# Patient Record
Sex: FEMALE | Race: WHITE | NOT HISPANIC OR LATINO | Employment: FULL TIME | URBAN - NONMETROPOLITAN AREA
[De-identification: names, ages, dates, MRNs, and addresses within clinical notes are randomized per-mention and may not be internally consistent; named-entity substitution may affect disease eponyms.]

---

## 2018-02-02 ENCOUNTER — OFFICE VISIT (OUTPATIENT)
Dept: FAMILY MEDICINE CLINIC | Facility: CLINIC | Age: 33
End: 2018-02-02

## 2018-02-02 VITALS
RESPIRATION RATE: 20 BRPM | HEIGHT: 62 IN | OXYGEN SATURATION: 99 % | SYSTOLIC BLOOD PRESSURE: 140 MMHG | WEIGHT: 273.4 LBS | DIASTOLIC BLOOD PRESSURE: 98 MMHG | BODY MASS INDEX: 50.31 KG/M2 | TEMPERATURE: 99.1 F | HEART RATE: 75 BPM

## 2018-02-02 DIAGNOSIS — R53.83 FATIGUE, UNSPECIFIED TYPE: ICD-10-CM

## 2018-02-02 DIAGNOSIS — Z76.89 ENCOUNTER TO ESTABLISH CARE: ICD-10-CM

## 2018-02-02 DIAGNOSIS — I10 ESSENTIAL HYPERTENSION: Primary | ICD-10-CM

## 2018-02-02 DIAGNOSIS — Z13.220 SCREENING FOR HYPERCHOLESTEROLEMIA: ICD-10-CM

## 2018-02-02 PROCEDURE — 99214 OFFICE O/P EST MOD 30 MIN: CPT | Performed by: NURSE PRACTITIONER

## 2018-02-02 RX ORDER — CLONIDINE HYDROCHLORIDE 0.2 MG/1
0.2 TABLET ORAL 2 TIMES DAILY
COMMUNITY
End: 2018-02-13 | Stop reason: SDUPTHER

## 2018-02-02 RX ORDER — AMLODIPINE BESYLATE 10 MG/1
10 TABLET ORAL DAILY
Qty: 30 TABLET | Refills: 3 | Status: SHIPPED | OUTPATIENT
Start: 2018-02-02 | End: 2018-02-27 | Stop reason: SDUPTHER

## 2018-02-02 RX ORDER — FERROUS SULFATE 325(65) MG
325 TABLET ORAL 3 TIMES DAILY
COMMUNITY
End: 2018-09-19

## 2018-02-02 RX ORDER — HYDRALAZINE HYDROCHLORIDE 10 MG/1
10 TABLET, FILM COATED ORAL 2 TIMES DAILY
COMMUNITY
End: 2018-02-13 | Stop reason: SDUPTHER

## 2018-02-02 RX ORDER — LISINOPRIL 40 MG/1
40 TABLET ORAL DAILY
COMMUNITY
End: 2018-02-02

## 2018-02-02 RX ORDER — LABETALOL 200 MG/1
200 TABLET, FILM COATED ORAL 2 TIMES DAILY
COMMUNITY
End: 2018-02-20 | Stop reason: SDUPTHER

## 2018-02-02 NOTE — PROGRESS NOTES
Subjective   Do Miles is a 32 y.o. female.  Establish primary care.  Has hypertension and is currently on four different medications and is her blood pressure is still high.  Has headaches almost every Monday through Friday but on the weekends headaches will go away.  Believes the headaches are more related to stress.  Initially hypertension developed while she was pertinent with her first daughter but then went away after she gave birth.  Returns when she was pregnant with her second daughter 4 years ago and has not gotten any better.  Last menstrual cycle 01/18/2018.  Has a Mirena and her periods are very regular monthly.      Hypertension   This is a chronic problem. The current episode started more than 1 year ago. The problem is unchanged. The problem is uncontrolled. Associated symptoms include headaches and neck pain. There are no associated agents to hypertension. Risk factors for coronary artery disease include family history, obesity, sedentary lifestyle and stress. Past treatments include ACE inhibitors and beta blockers. The current treatment provides moderate improvement. There are no compliance problems.         The following portions of the patient's history were reviewed and updated as appropriate: allergies, current medications, past family history, past medical history, past social history, past surgical history and problem list.    Review of Systems   Constitutional: Negative.    HENT: Negative for rhinorrhea, sinus pain and sinus pressure.    Eyes: Negative.    Respiratory: Negative.    Cardiovascular: Negative.    Gastrointestinal: Negative.    Genitourinary: Negative.    Musculoskeletal: Positive for neck pain.   Skin: Negative.    Neurological: Positive for headaches.       Objective   Physical Exam   Constitutional: She is oriented to person, place, and time. She appears well-developed and well-nourished.   HENT:   Head: Normocephalic.   Right Ear: External ear normal.   Left Ear:  External ear normal.   Mouth/Throat: Oropharynx is clear and moist.   Eyes: EOM are normal. Pupils are equal, round, and reactive to light.   Neck: Normal range of motion. Neck supple.   Cardiovascular: Normal rate, regular rhythm and normal heart sounds.    Pulmonary/Chest: Effort normal and breath sounds normal.   Abdominal: Soft. Bowel sounds are normal.   Musculoskeletal: Normal range of motion.   Neurological: She is alert and oriented to person, place, and time.   Skin: Skin is warm and dry.   Psychiatric: She has a normal mood and affect. Her behavior is normal. Judgment and thought content normal.   Nursing note and vitals reviewed.      Assessment/Plan   Problems Addressed this Visit     None      Visit Diagnoses     Essential hypertension    -  Primary    Relevant Medications    labetalol (NORMODYNE) 200 MG tablet    hydrALAZINE (APRESOLINE) 10 MG tablet    CloNIDine (CATAPRES) 0.2 MG tablet    amLODIPine (NORVASC) 10 MG tablet    Fatigue, unspecified type        Relevant Orders    CBC w AUTO Differential    Comprehensive metabolic panel    TSH    Screening for hypercholesterolemia        Relevant Orders    Lipid panel    Encounter to establish care            1.  Essential hypertension:  Continue on labetalol, hydralazine and clonidine as previously prescribed  Continue lisinopril at this time  Begin prescription for Norvasc 10 mg as prescribed be taken 1 tablet by mouth daily  Encouraged to reduce sodium intake to no more than 2400 mg per day    2.  Fatigue, unspecified type:  Complete lab work is ordered including CBC, chemistry panel and TSH and will call with results when available  Discussed obesity as a factor to fatigue  Will schedule appointment to discuss weight loss options in 4 weeks  Information provided on Saxenda and Contrave    3.  Screening for hypercholesterolemia:  Complete lab work is ordered including fasting lipid panel and will call with results when available    4.  Encounter to  establish care:  Schedule annual exam with Pap smear in 4 weeks        This document has been electronically signed by JEWELS Sierra on February 2, 2018 9:51 AM

## 2018-02-05 ENCOUNTER — OFFICE VISIT (OUTPATIENT)
Dept: FAMILY MEDICINE CLINIC | Facility: CLINIC | Age: 33
End: 2018-02-05

## 2018-02-05 VITALS
SYSTOLIC BLOOD PRESSURE: 126 MMHG | HEIGHT: 62 IN | DIASTOLIC BLOOD PRESSURE: 80 MMHG | WEIGHT: 273 LBS | BODY MASS INDEX: 50.24 KG/M2 | OXYGEN SATURATION: 98 % | TEMPERATURE: 99.4 F | HEART RATE: 81 BPM

## 2018-02-05 DIAGNOSIS — J06.9 ACUTE URI: Primary | ICD-10-CM

## 2018-02-05 PROCEDURE — 99213 OFFICE O/P EST LOW 20 MIN: CPT | Performed by: FAMILY MEDICINE

## 2018-02-09 ENCOUNTER — OFFICE VISIT (OUTPATIENT)
Dept: FAMILY MEDICINE CLINIC | Facility: CLINIC | Age: 33
End: 2018-02-09

## 2018-02-09 VITALS
DIASTOLIC BLOOD PRESSURE: 100 MMHG | BODY MASS INDEX: 50.24 KG/M2 | TEMPERATURE: 99.9 F | HEART RATE: 100 BPM | OXYGEN SATURATION: 99 % | HEIGHT: 62 IN | SYSTOLIC BLOOD PRESSURE: 140 MMHG | WEIGHT: 273 LBS

## 2018-02-09 DIAGNOSIS — J22 LOWER RESPIRATORY INFECTION: Primary | ICD-10-CM

## 2018-02-09 PROCEDURE — 96372 THER/PROPH/DIAG INJ SC/IM: CPT | Performed by: FAMILY MEDICINE

## 2018-02-09 PROCEDURE — 99213 OFFICE O/P EST LOW 20 MIN: CPT | Performed by: FAMILY MEDICINE

## 2018-02-09 RX ORDER — CEFTRIAXONE 1 G/1
1 INJECTION, POWDER, FOR SOLUTION INTRAMUSCULAR; INTRAVENOUS ONCE
Status: COMPLETED | OUTPATIENT
Start: 2018-02-09 | End: 2018-02-09

## 2018-02-09 RX ORDER — AZITHROMYCIN 250 MG/1
TABLET, FILM COATED ORAL
Qty: 6 TABLET | Refills: 0 | Status: SHIPPED | OUTPATIENT
Start: 2018-02-09 | End: 2018-03-19 | Stop reason: HOSPADM

## 2018-02-09 RX ADMIN — CEFTRIAXONE 1 G: 1 INJECTION, POWDER, FOR SOLUTION INTRAMUSCULAR; INTRAVENOUS at 10:10

## 2018-02-09 NOTE — PROGRESS NOTES
Subjective:     Do Miles is a 32 y.o. female who presents for evaluation of acute illness.    Chief Complaint   Patient presents with   • Cough     chest pain    • Chills       HPI Comments: Patient was diagnosed with influenza on Tuesday.  She reports that she continues to have fevers.  She has cough and chest congestion.  Reports some pleuritic chest pain when coughing.  States she doesn't seem to be getting any better.     Cough   This is a new problem. The current episode started in the past 7 days. The problem has been unchanged. The problem occurs constantly. Associated symptoms include chest pain (pleuritic), chills, a fever, myalgias, nasal congestion and rhinorrhea. Pertinent negatives include no headaches, rash, sore throat, shortness of breath or wheezing. She has tried OTC cough suppressant for the symptoms. The treatment provided no relief. There is no history of asthma.   Chills   Associated symptoms include chest pain (pleuritic), chills, congestion, coughing, fatigue, a fever and myalgias. Pertinent negatives include no abdominal pain, arthralgias, headaches, nausea, rash, sore throat, vomiting or weakness.        The following portions of the patient's history were reviewed and updated as appropriate: allergies, current medications, past family history, past medical history, past social history, past surgical history and problem list.    Past Medical History:   Diagnosis Date   • Allergic    • Anemia    • Anxiety    • Diabetes mellitus     Gestational   • Heart murmur    • History of ear infections    • Hypertension    • Influenza    • Intrahepatic cholestasis of pregnancy    • Obesity    • Pneumonia    • Strep throat    • Urinary tract infection        Past Surgical History:   Procedure Laterality Date   • ANKLE SURGERY      left ankle   •  SECTION     • DILATION AND CURETTAGE, DIAGNOSTIC / THERAPEUTIC     • HYSTEROSCOPY         Family History   Problem Relation Age of Onset   •  Hypertension Mother    • Uterine cancer Mother    • No Known Problems Father    • No Known Problems Sister    • No Known Problems Brother    • Eczema Daughter    • Hypertension Maternal Grandmother    • Hypertension Maternal Grandfather    • Diabetes Maternal Grandfather    • No Known Problems Paternal Grandmother    • No Known Problems Paternal Grandfather    • No Known Problems Sister    • Eczema Daughter          Current Outpatient Prescriptions:   •  amLODIPine (NORVASC) 10 MG tablet, Take 1 tablet by mouth Daily., Disp: 30 tablet, Rfl: 3  •  CloNIDine (CATAPRES) 0.2 MG tablet, Take 0.2 mg by mouth 2 (Two) Times a Day. AT BEDTIME, Disp: , Rfl:   •  ferrous sulfate 325 (65 FE) MG tablet, Take 325 mg by mouth 3 (Three) Times a Day., Disp: , Rfl:   •  hydrALAZINE (APRESOLINE) 10 MG tablet, Take 10 mg by mouth 2 (Two) Times a Day., Disp: , Rfl:   •  labetalol (NORMODYNE) 200 MG tablet, Take 200 mg by mouth 2 (Two) Times a Day., Disp: , Rfl:   •  levonorgestrel (MIRENA) 20 MCG/24HR IUD, 1 each by Intrauterine route 1 (One) Time., Disp: , Rfl:     Allergies   Allergen Reactions   • Latex    • Adhesive Tape Hives       Social History     Social History   • Marital status:      Spouse name: N/A   • Number of children: N/A   • Years of education: N/A     Occupational History   • Not on file.     Social History Main Topics   • Smoking status: Never Smoker   • Smokeless tobacco: Never Used   • Alcohol use No   • Drug use: No   • Sexual activity: Yes     Partners: Male     Birth control/ protection: IUD     Other Topics Concern   • Not on file     Social History Narrative   • No narrative on file       Review of Systems   Constitutional: Positive for chills, fatigue and fever.   HENT: Positive for congestion and rhinorrhea. Negative for sinus pressure and sore throat.    Respiratory: Positive for cough. Negative for shortness of breath, wheezing and stridor.    Cardiovascular: Positive for chest pain (pleuritic).  "Negative for palpitations.   Gastrointestinal: Negative for abdominal pain, constipation, diarrhea, nausea and vomiting.   Genitourinary: Negative for decreased urine volume, difficulty urinating and dysuria.   Musculoskeletal: Positive for myalgias. Negative for arthralgias.   Skin: Negative for color change and rash.   Neurological: Negative for dizziness, weakness, light-headedness and headaches.   Psychiatric/Behavioral: Negative for confusion and decreased concentration. The patient is not nervous/anxious.        Objective:     /100  Pulse 100  Temp 99.9 °F (37.7 °C)  Ht 157.5 cm (62\")  Wt 124 kg (273 lb)  LMP 01/18/2018  SpO2 99%  BMI 49.93 kg/m2    Physical Exam   Constitutional: She is oriented to person, place, and time. She appears well-developed and well-nourished.   HENT:   Head: Normocephalic and atraumatic.   Nose: Mucosal edema present.   Mouth/Throat: Posterior oropharyngeal erythema present.   Eyes: Conjunctivae and EOM are normal. Pupils are equal, round, and reactive to light.   Neck: Normal range of motion. Neck supple.   Cardiovascular: Normal rate, regular rhythm, normal heart sounds and intact distal pulses.  Exam reveals no gallop and no friction rub.    No murmur heard.  Pulmonary/Chest: Effort normal. No respiratory distress. She has no wheezes. She has rhonchi in the left middle field. She has no rales.   Abdominal: Soft. Bowel sounds are normal. She exhibits no distension. There is no tenderness.   Musculoskeletal: Normal range of motion.   Neurological: She is alert and oriented to person, place, and time.   Skin: Skin is warm and dry.   Psychiatric: She has a normal mood and affect. Her behavior is normal.   Vitals reviewed.      Assessment/Plan:     Do was seen today for cough and chills.    Diagnoses and all orders for this visit:    Lower respiratory infection  -     cefTRIAXone (ROCEPHIN) injection 1 g; Inject 1 g into the shoulder, thigh, or buttocks 1 (One) " Time.  -     azithromycin (ZITHROMAX Z-MAHESH) 250 MG tablet; Take 2 tablets by mouth the first day, then 1 tablet daily for the next 4 days.  -     XR Chest PA & Lateral; Future      Return for Next scheduled follow up.    Goals     • Recover from flu            Barriers: none            Return for Next scheduled follow up.    Signature  Luz Marina Hennessy MD PGY3  [place] [address]  Office: [phone]    This document has been electronically signed by Luz Marina Hennessy MD on February 9, 2018 9:54 AM

## 2018-02-12 ENCOUNTER — TELEPHONE (OUTPATIENT)
Dept: ENDOCRINOLOGY | Facility: CLINIC | Age: 33
End: 2018-02-12

## 2018-02-13 DIAGNOSIS — I10 ESSENTIAL HYPERTENSION: Primary | ICD-10-CM

## 2018-02-13 RX ORDER — CLONIDINE HYDROCHLORIDE 0.2 MG/1
0.2 TABLET ORAL
Qty: 30 TABLET | Refills: 5 | Status: SHIPPED | OUTPATIENT
Start: 2018-02-13 | End: 2018-07-05 | Stop reason: SDUPTHER

## 2018-02-13 RX ORDER — HYDRALAZINE HYDROCHLORIDE 10 MG/1
10 TABLET, FILM COATED ORAL 2 TIMES DAILY
Qty: 60 TABLET | Refills: 5 | Status: SHIPPED | OUTPATIENT
Start: 2018-02-13 | End: 2018-07-05 | Stop reason: SDUPTHER

## 2018-02-20 ENCOUNTER — TELEPHONE (OUTPATIENT)
Dept: FAMILY MEDICINE CLINIC | Facility: CLINIC | Age: 33
End: 2018-02-20

## 2018-02-20 DIAGNOSIS — I10 ESSENTIAL HYPERTENSION: Primary | ICD-10-CM

## 2018-02-20 RX ORDER — LABETALOL 200 MG/1
200 TABLET, FILM COATED ORAL 2 TIMES DAILY
Qty: 60 TABLET | Refills: 3 | Status: SHIPPED | OUTPATIENT
Start: 2018-02-20 | End: 2018-05-31

## 2018-02-27 DIAGNOSIS — I10 ESSENTIAL HYPERTENSION: ICD-10-CM

## 2018-02-27 RX ORDER — AMLODIPINE BESYLATE 10 MG/1
10 TABLET ORAL DAILY
Qty: 30 TABLET | Refills: 3 | Status: SHIPPED | OUTPATIENT
Start: 2018-02-27 | End: 2018-05-31

## 2018-03-19 ENCOUNTER — OFFICE VISIT (OUTPATIENT)
Dept: FAMILY MEDICINE CLINIC | Facility: CLINIC | Age: 33
End: 2018-03-19

## 2018-03-19 ENCOUNTER — LAB (OUTPATIENT)
Dept: LAB | Facility: HOSPITAL | Age: 33
End: 2018-03-19

## 2018-03-19 VITALS
HEART RATE: 71 BPM | HEIGHT: 62 IN | TEMPERATURE: 98 F | SYSTOLIC BLOOD PRESSURE: 150 MMHG | BODY MASS INDEX: 50.66 KG/M2 | DIASTOLIC BLOOD PRESSURE: 98 MMHG | OXYGEN SATURATION: 99 % | WEIGHT: 275.3 LBS | RESPIRATION RATE: 20 BRPM

## 2018-03-19 DIAGNOSIS — R53.83 FATIGUE, UNSPECIFIED TYPE: ICD-10-CM

## 2018-03-19 DIAGNOSIS — Z13.220 SCREENING FOR HYPERCHOLESTEROLEMIA: ICD-10-CM

## 2018-03-19 DIAGNOSIS — J01.00 ACUTE NON-RECURRENT MAXILLARY SINUSITIS: ICD-10-CM

## 2018-03-19 DIAGNOSIS — E66.01 MORBID OBESITY WITH BMI OF 50.0-59.9, ADULT (HCC): Primary | ICD-10-CM

## 2018-03-19 LAB
ALBUMIN SERPL-MCNC: 4.1 G/DL (ref 3.4–4.8)
ALBUMIN/GLOB SERPL: 1.2 G/DL (ref 1.1–1.8)
ALP SERPL-CCNC: 104 U/L (ref 38–126)
ALT SERPL W P-5'-P-CCNC: 32 U/L (ref 9–52)
ANION GAP SERPL CALCULATED.3IONS-SCNC: 14 MMOL/L (ref 5–15)
ARTICHOKE IGE QN: 91 MG/DL (ref 1–129)
AST SERPL-CCNC: 34 U/L (ref 14–36)
BASOPHILS # BLD AUTO: 0.07 10*3/MM3 (ref 0–0.2)
BASOPHILS NFR BLD AUTO: 0.7 % (ref 0–2)
BILIRUB SERPL-MCNC: 0.2 MG/DL (ref 0.2–1.3)
BUN BLD-MCNC: 14 MG/DL (ref 7–21)
BUN/CREAT SERPL: 18.4 (ref 7–25)
CALCIUM SPEC-SCNC: 10.1 MG/DL (ref 8.4–10.2)
CHLORIDE SERPL-SCNC: 102 MMOL/L (ref 95–110)
CHOLEST SERPL-MCNC: 174 MG/DL (ref 0–199)
CO2 SERPL-SCNC: 28 MMOL/L (ref 22–31)
CREAT BLD-MCNC: 0.76 MG/DL (ref 0.5–1)
DEPRECATED RDW RBC AUTO: 43.5 FL (ref 36.4–46.3)
EOSINOPHIL # BLD AUTO: 0.24 10*3/MM3 (ref 0–0.7)
EOSINOPHIL NFR BLD AUTO: 2.4 % (ref 0–7)
ERYTHROCYTE [DISTWIDTH] IN BLOOD BY AUTOMATED COUNT: 14.5 % (ref 11.5–14.5)
GFR SERPL CREATININE-BSD FRML MDRD: 88 ML/MIN/1.73 (ref 64–149)
GLOBULIN UR ELPH-MCNC: 3.4 GM/DL (ref 2.3–3.5)
GLUCOSE BLD-MCNC: 96 MG/DL (ref 60–100)
HCT VFR BLD AUTO: 36.2 % (ref 35–45)
HDLC SERPL-MCNC: 44 MG/DL (ref 60–200)
HGB BLD-MCNC: 11.4 G/DL (ref 12–15.5)
IMM GRANULOCYTES # BLD: 0.02 10*3/MM3 (ref 0–0.02)
IMM GRANULOCYTES NFR BLD: 0.2 % (ref 0–0.5)
LDLC/HDLC SERPL: 2.67 {RATIO} (ref 0–3.22)
LYMPHOCYTES # BLD AUTO: 1.5 10*3/MM3 (ref 0.6–4.2)
LYMPHOCYTES NFR BLD AUTO: 15 % (ref 10–50)
MCH RBC QN AUTO: 26.1 PG (ref 26.5–34)
MCHC RBC AUTO-ENTMCNC: 31.5 G/DL (ref 31.4–36)
MCV RBC AUTO: 83 FL (ref 80–98)
MONOCYTES # BLD AUTO: 0.58 10*3/MM3 (ref 0–0.9)
MONOCYTES NFR BLD AUTO: 5.8 % (ref 0–12)
NEUTROPHILS # BLD AUTO: 7.59 10*3/MM3 (ref 2–8.6)
NEUTROPHILS NFR BLD AUTO: 75.9 % (ref 37–80)
PLATELET # BLD AUTO: 360 10*3/MM3 (ref 150–450)
PMV BLD AUTO: 10.5 FL (ref 8–12)
POTASSIUM BLD-SCNC: 3.9 MMOL/L (ref 3.5–5.1)
PROT SERPL-MCNC: 7.5 G/DL (ref 6.3–8.6)
RBC # BLD AUTO: 4.36 10*6/MM3 (ref 3.77–5.16)
SODIUM BLD-SCNC: 144 MMOL/L (ref 137–145)
TRIGL SERPL-MCNC: 62 MG/DL (ref 20–199)
TSH SERPL DL<=0.05 MIU/L-ACNC: 2.41 MIU/ML (ref 0.46–4.68)
WBC NRBC COR # BLD: 10 10*3/MM3 (ref 3.2–9.8)

## 2018-03-19 PROCEDURE — 80053 COMPREHEN METABOLIC PANEL: CPT

## 2018-03-19 PROCEDURE — 96372 THER/PROPH/DIAG INJ SC/IM: CPT | Performed by: NURSE PRACTITIONER

## 2018-03-19 PROCEDURE — 99214 OFFICE O/P EST MOD 30 MIN: CPT | Performed by: NURSE PRACTITIONER

## 2018-03-19 PROCEDURE — 85025 COMPLETE CBC W/AUTO DIFF WBC: CPT

## 2018-03-19 PROCEDURE — 80061 LIPID PANEL: CPT

## 2018-03-19 PROCEDURE — 84443 ASSAY THYROID STIM HORMONE: CPT

## 2018-03-19 RX ORDER — AMOXICILLIN AND CLAVULANATE POTASSIUM 875; 125 MG/1; MG/1
1 TABLET, FILM COATED ORAL 2 TIMES DAILY
Qty: 20 TABLET | Refills: 0 | Status: SHIPPED | OUTPATIENT
Start: 2018-03-19 | End: 2018-09-19

## 2018-03-19 RX ORDER — TRIAMCINOLONE ACETONIDE 40 MG/ML
60 INJECTION, SUSPENSION INTRA-ARTICULAR; INTRAMUSCULAR ONCE
Status: COMPLETED | OUTPATIENT
Start: 2018-03-19 | End: 2018-03-19

## 2018-03-19 RX ADMIN — TRIAMCINOLONE ACETONIDE 60 MG: 40 INJECTION, SUSPENSION INTRA-ARTICULAR; INTRAMUSCULAR at 13:15

## 2018-03-19 NOTE — PROGRESS NOTES
"Subjective   Do Miles is a 32 y.o. female.  Four week recheck to discuss morbid obesity and weight loss options.  Has been watching her diet and tried walking at home for the past few weeks but has not been successful losing any weight.  Has had chest congestion with cough of yellow sputum for the past week.  Has been having sinus pressure and \"so much drainage that my throat is hurting.  Has had some nausea but no vomiting.  Had lab work completed this morning prior to visit and would like to discuss results.    Cough   This is a new problem. The current episode started in the past 7 days. The problem has been unchanged. The problem occurs hourly. The cough is productive of sputum. Associated symptoms include ear pain. Pertinent negatives include no headaches, rash, sore throat, shortness of breath or wheezing. Chest pain: pleuritic. The symptoms are aggravated by lying down. She has tried nothing for the symptoms. The treatment provided no relief.   Earache    There is pain in the right ear. This is a new problem. The current episode started in the past 7 days. The problem occurs constantly. The problem has been waxing and waning. There has been no fever. The pain is at a severity of 3/10. The pain is moderate. Associated symptoms include coughing. Pertinent negatives include no abdominal pain, diarrhea, headaches, rash, sore throat or vomiting. She has tried NSAIDs and acetaminophen for the symptoms. The treatment provided no relief.   Obesity   This is a chronic problem. The current episode started more than 1 year ago. The problem occurs constantly. The problem has been gradually worsening. Associated symptoms include coughing. Pertinent negatives include no abdominal pain, arthralgias, headaches, nausea, rash, sore throat, vomiting or weakness. Chest pain: pleuritic. The symptoms are aggravated by drinking and eating. She has tried walking for the symptoms. The treatment provided no relief.    "     The following portions of the patient's history were reviewed and updated as appropriate: allergies, current medications, past family history, past medical history, past social history, past surgical history and problem list.    Review of Systems   Constitutional: Negative.    HENT: Positive for ear pain. Negative for sinus pressure and sore throat.    Respiratory: Positive for cough. Negative for shortness of breath, wheezing and stridor.    Cardiovascular: Negative.  Negative for palpitations. Chest pain: pleuritic.   Gastrointestinal: Negative for abdominal pain, constipation, diarrhea, nausea and vomiting.   Genitourinary: Negative for decreased urine volume, difficulty urinating and dysuria.   Musculoskeletal: Negative.  Negative for arthralgias.   Skin: Negative for color change and rash.   Neurological: Negative for dizziness, weakness, light-headedness and headaches.   Psychiatric/Behavioral: Negative for confusion and decreased concentration. The patient is not nervous/anxious.        Objective   Physical Exam   Constitutional: She is oriented to person, place, and time. She appears well-developed and well-nourished.   HENT:   Head: Normocephalic and atraumatic.   Right Ear: There is tenderness. Tympanic membrane is erythematous. A middle ear effusion is present.   Left Ear: Tympanic membrane normal.   Nose: Mucosal edema and rhinorrhea present. Right sinus exhibits maxillary sinus tenderness. Left sinus exhibits maxillary sinus tenderness.   Mouth/Throat: Posterior oropharyngeal erythema present.   Eyes: Conjunctivae and EOM are normal. Pupils are equal, round, and reactive to light.   Neck: Normal range of motion. Neck supple.   Cardiovascular: Normal rate, regular rhythm, normal heart sounds and intact distal pulses.  Exam reveals no gallop and no friction rub.    No murmur heard.  Pulmonary/Chest: Effort normal. No respiratory distress. She has no wheezes. She has rhonchi in the left middle field.  She has no rales.   Abdominal: Soft. Bowel sounds are normal. She exhibits no distension. There is no tenderness.   Musculoskeletal: Normal range of motion.   Neurological: She is alert and oriented to person, place, and time.   Skin: Skin is warm and dry.   Psychiatric: She has a normal mood and affect. Her behavior is normal.   Vitals reviewed.    Results for orders placed or performed in visit on 03/19/18   Comprehensive metabolic panel   Result Value Ref Range    Glucose 96 60 - 100 mg/dL    BUN 14 7 - 21 mg/dL    Creatinine 0.76 0.50 - 1.00 mg/dL    Sodium 144 137 - 145 mmol/L    Potassium 3.9 3.5 - 5.1 mmol/L    Chloride 102 95 - 110 mmol/L    CO2 28.0 22.0 - 31.0 mmol/L    Calcium 10.1 8.4 - 10.2 mg/dL    Total Protein 7.5 6.3 - 8.6 g/dL    Albumin 4.10 3.40 - 4.80 g/dL    ALT (SGPT) 32 9 - 52 U/L    AST (SGOT) 34 14 - 36 U/L    Alkaline Phosphatase 104 38 - 126 U/L    Total Bilirubin 0.2 0.2 - 1.3 mg/dL    eGFR Non  Amer 88 64 - 149 mL/min/1.73    Globulin 3.4 2.3 - 3.5 gm/dL    A/G Ratio 1.2 1.1 - 1.8 g/dL    BUN/Creatinine Ratio 18.4 7.0 - 25.0    Anion Gap 14.0 5.0 - 15.0 mmol/L   Lipid panel   Result Value Ref Range    Total Cholesterol 174 0 - 199 mg/dL    Triglycerides 62 20 - 199 mg/dL    HDL Cholesterol 44 (L) 60 - 200 mg/dL    LDL Cholesterol  91 1 - 129 mg/dL    LDL/HDL Ratio 2.67 0.00 - 3.22   TSH   Result Value Ref Range    TSH 2.410 0.460 - 4.680 mIU/mL   CBC Auto Differential   Result Value Ref Range    WBC 10.00 (H) 3.20 - 9.80 10*3/mm3    RBC 4.36 3.77 - 5.16 10*6/mm3    Hemoglobin 11.4 (L) 12.0 - 15.5 g/dL    Hematocrit 36.2 35.0 - 45.0 %    MCV 83.0 80.0 - 98.0 fL    MCH 26.1 (L) 26.5 - 34.0 pg    MCHC 31.5 31.4 - 36.0 g/dL    RDW 14.5 11.5 - 14.5 %    RDW-SD 43.5 36.4 - 46.3 fl    MPV 10.5 8.0 - 12.0 fL    Platelets 360 150 - 450 10*3/mm3    Neutrophil % 75.9 37.0 - 80.0 %    Lymphocyte % 15.0 10.0 - 50.0 %    Monocyte % 5.8 0.0 - 12.0 %    Eosinophil % 2.4 0.0 - 7.0 %    Basophil %  0.7 0.0 - 2.0 %    Immature Grans % 0.2 0.0 - 0.5 %    Neutrophils, Absolute 7.59 2.00 - 8.60 10*3/mm3    Lymphocytes, Absolute 1.50 0.60 - 4.20 10*3/mm3    Monocytes, Absolute 0.58 0.00 - 0.90 10*3/mm3    Eosinophils, Absolute 0.24 0.00 - 0.70 10*3/mm3    Basophils, Absolute 0.07 0.00 - 0.20 10*3/mm3    Immature Grans, Absolute 0.02 0.00 - 0.02 10*3/mm3         Assessment/Plan   Problems Addressed this Visit     None      Visit Diagnoses     Morbid obesity with BMI of 50.0-59.9, adult    -  Primary    Relevant Medications    naltrexone-bupropion ER (CONTRAVE) 8-90 MG tablet    Acute non-recurrent maxillary sinusitis        Relevant Medications    amoxicillin-clavulanate (AUGMENTIN) 875-125 MG per tablet    triamcinolone acetonide (KENALOG-40) injection 60 mg (Start on 3/19/2018  2:00 PM)        1.  Morbid obesity with BMI of 50.0-59.9 adult:  Begin Contrave as prescribed to be titrated to a maximum dose of 2 pills twice a day  Reduce caloric intake to no more than 2200 calories per day  Increase physical activity to a minimum of 150 minutes of week   Weight loss goal of 8 pounds in the first 4 weeks of beginning Contrave  Schedule follow-up appointment with this office in 4 weeks for recheck    2.  Acute nonrecurrent maxillary sinusitis:  Begin Augmentin 875-125 mg as prescribed to be taken 1 by mouth twice a day ×10 days  Educated on the importance of completing full dose of antibiotic therapy  Encouraged to begin the use of over-the-counter nasal spray such as Flonase according to package directions  Kenalog 60 mg IM given in office  Schedule follow-up appointment with this office in 48-72 hours if no improvement or worsening of symptoms        This document has been electronically signed by JEWELS Sierra on March 19, 2018 1:24 PM

## 2018-03-22 ENCOUNTER — PRIOR AUTHORIZATION (OUTPATIENT)
Dept: FAMILY MEDICINE CLINIC | Facility: CLINIC | Age: 33
End: 2018-03-22

## 2018-03-27 ENCOUNTER — TELEPHONE (OUTPATIENT)
Dept: FAMILY MEDICINE CLINIC | Facility: CLINIC | Age: 33
End: 2018-03-27

## 2018-03-27 DIAGNOSIS — N89.8 VAGINAL ITCHING: Primary | ICD-10-CM

## 2018-03-27 RX ORDER — FLUCONAZOLE 150 MG/1
150 TABLET ORAL ONCE
Qty: 2 TABLET | Refills: 1 | Status: SHIPPED | OUTPATIENT
Start: 2018-03-27 | End: 2018-03-30

## 2018-03-27 NOTE — TELEPHONE ENCOUNTER
"Do Miles called with complaints of, \"slight burning and vaginal itching and discharge.\"  Per JEWELS Milan, a prescription for Diflucan 150 mg;  (2) tabs.was [facility]  Instructed patient to take (1) tablet now; if symptoms persist in 72 hours take another tablet.  Continue current meds and follow up as planned or sooner if any problems.   "

## 2018-05-31 DIAGNOSIS — I10 ESSENTIAL HYPERTENSION: ICD-10-CM

## 2018-05-31 RX ORDER — AMLODIPINE BESYLATE 10 MG/1
10 TABLET ORAL DAILY
Qty: 30 TABLET | Refills: 3 | Status: SHIPPED | OUTPATIENT
Start: 2018-05-31 | End: 2018-07-05 | Stop reason: SDUPTHER

## 2018-05-31 RX ORDER — LABETALOL 200 MG/1
200 TABLET, FILM COATED ORAL 2 TIMES DAILY
Qty: 60 TABLET | Refills: 3 | Status: SHIPPED | OUTPATIENT
Start: 2018-05-31 | End: 2018-07-05 | Stop reason: SDUPTHER

## 2018-07-05 DIAGNOSIS — J01.00 ACUTE NON-RECURRENT MAXILLARY SINUSITIS: ICD-10-CM

## 2018-07-05 DIAGNOSIS — I10 ESSENTIAL HYPERTENSION: ICD-10-CM

## 2018-07-05 RX ORDER — HYDRALAZINE HYDROCHLORIDE 10 MG/1
10 TABLET, FILM COATED ORAL 2 TIMES DAILY
Qty: 60 TABLET | Refills: 3 | Status: SHIPPED | OUTPATIENT
Start: 2018-07-05 | End: 2018-11-23

## 2018-07-05 RX ORDER — CLONIDINE HYDROCHLORIDE 0.2 MG/1
0.2 TABLET ORAL
Qty: 30 TABLET | Refills: 3 | Status: SHIPPED | OUTPATIENT
Start: 2018-07-05 | End: 2018-10-24

## 2018-07-05 RX ORDER — LABETALOL 200 MG/1
200 TABLET, FILM COATED ORAL 2 TIMES DAILY
Qty: 60 TABLET | Refills: 3 | Status: SHIPPED | OUTPATIENT
Start: 2018-07-05 | End: 2018-10-24

## 2018-07-05 RX ORDER — AMLODIPINE BESYLATE 10 MG/1
10 TABLET ORAL DAILY
Qty: 30 TABLET | Refills: 3 | Status: SHIPPED | OUTPATIENT
Start: 2018-07-05 | End: 2018-10-24

## 2018-09-19 ENCOUNTER — OFFICE VISIT (OUTPATIENT)
Dept: FAMILY MEDICINE CLINIC | Facility: CLINIC | Age: 33
End: 2018-09-19

## 2018-09-19 VITALS
HEART RATE: 76 BPM | TEMPERATURE: 99.9 F | SYSTOLIC BLOOD PRESSURE: 130 MMHG | DIASTOLIC BLOOD PRESSURE: 78 MMHG | BODY MASS INDEX: 50.24 KG/M2 | WEIGHT: 273 LBS | HEIGHT: 62 IN | OXYGEN SATURATION: 98 %

## 2018-09-19 DIAGNOSIS — J06.9 VIRAL UPPER RESPIRATORY TRACT INFECTION: Primary | ICD-10-CM

## 2018-09-19 PROCEDURE — 99213 OFFICE O/P EST LOW 20 MIN: CPT | Performed by: STUDENT IN AN ORGANIZED HEALTH CARE EDUCATION/TRAINING PROGRAM

## 2018-09-19 NOTE — PROGRESS NOTES
[facility] PROGRESS NOTE  David Malave Jr, MD    Subjective   Fever (onset today after lunch); Sore Throat; Earache (Right); and Cough      Subjective:     Do Miles is a 33 y.o. female who presents for initial evaluation for sore throat, cough, & right ear pain.    Sore Throat  Patient complains of sore throat. Associated symptoms include right ear pain, nasal blockage, post nasal drip, sinus and nasal congestion and sore throat. Onset of symptoms was a few hours ago, and have been gradually worsening since that time. She is drinking moderate amounts of fluids. She has not had recent close exposure to someone with proven streptococcal pharyngitis.    I have reviewed the patient's medical history in detail; there are no changes to the history as noted in the electronic medical record.    Past Medical Hx:  Past Medical History:   Diagnosis Date   • Allergic    • Anemia    • Anxiety    • Diabetes mellitus (CMS/HCC)     Gestational   • Heart murmur    • History of ear infections    • Hypertension    • Influenza    • Intrahepatic cholestasis of pregnancy    • Obesity    • Pneumonia    • Strep throat    • Urinary tract infection        Past Surgical Hx:  Past Surgical History:   Procedure Laterality Date   • ANKLE SURGERY      left ankle   •  SECTION     • DILATION AND CURETTAGE, DIAGNOSTIC / THERAPEUTIC     • HYSTEROSCOPY         Health Maintenance:  Health Maintenance   Topic Date Due   • ANNUAL PHYSICAL  1988   • TDAP/TD VACCINES (1 - Tdap) 2004   • PAP SMEAR  2018   • INFLUENZA VACCINE  2018       Current Meds:    Current Outpatient Prescriptions:   •  amLODIPine (NORVASC) 10 MG tablet, Take 1 tablet by mouth Daily., Disp: 30 tablet, Rfl: 3  •  CloNIDine (CATAPRES) 0.2 MG tablet, Take 1 tablet by mouth every night at bedtime., Disp: 30 tablet, Rfl: 3  •  hydrALAZINE (APRESOLINE) 10 MG tablet, Take 1 tablet by mouth 2 (Two) Times a Day., Disp: 60  tablet, Rfl: 3  •  labetalol (NORMODYNE) 200 MG tablet, Take 1 tablet by mouth 2 (Two) Times a Day., Disp: 60 tablet, Rfl: 3  •  levonorgestrel (MIRENA) 20 MCG/24HR IUD, 1 each by Intrauterine route 1 (One) Time., Disp: , Rfl:     Allergies:  Latex and Adhesive tape    Family Hx:  Family History   Problem Relation Age of Onset   • Hypertension Mother    • Uterine cancer Mother    • No Known Problems Father    • No Known Problems Sister    • No Known Problems Brother    • Eczema Daughter    • Hypertension Maternal Grandmother    • Hypertension Maternal Grandfather    • Diabetes Maternal Grandfather    • No Known Problems Paternal Grandmother    • No Known Problems Paternal Grandfather    • No Known Problems Sister    • Eczema Daughter         Social History:  Social History     Social History   • Marital status:      Spouse name: N/A   • Number of children: N/A   • Years of education: N/A     Occupational History   • Not on file.     Social History Main Topics   • Smoking status: Never Smoker   • Smokeless tobacco: Never Used   • Alcohol use No   • Drug use: No   • Sexual activity: Yes     Partners: Male     Birth control/ protection: IUD     Other Topics Concern   • Not on file     Social History Narrative   • No narrative on file       Review of Systems  Review of Systems   Constitutional: Negative for activity change, fatigue and fever.   HENT: Positive for congestion, ear pain (Right ear), rhinorrhea, sinus pain and sore throat. Negative for ear discharge and trouble swallowing.    Eyes: Negative for pain and visual disturbance.   Respiratory: Positive for cough. Negative for shortness of breath and wheezing.    Cardiovascular: Negative for chest pain and palpitations.   Gastrointestinal: Positive for nausea (mild). Negative for abdominal distention, abdominal pain, constipation, diarrhea and vomiting.   Genitourinary: Negative for dysuria and hematuria.   Musculoskeletal: Positive for myalgias. Negative  "for arthralgias.   Skin: Negative for color change and rash.   Neurological: Negative for syncope, weakness and headaches.   Hematological: Positive for adenopathy.   Psychiatric/Behavioral: Negative for agitation and confusion.     Objective   Objective:     Vitals:    09/19/18 1546   BP: 130/78   Pulse: 76   Temp: 99.9 °F (37.7 °C)   SpO2: 98%   Weight: 124 kg (273 lb)   Height: 157.5 cm (62\")       Physical Exam   Constitutional: She is oriented to person, place, and time. She appears well-developed and well-nourished. No distress.   HENT:   Head: Normocephalic and atraumatic.   Right Ear: Hearing and external ear normal. Tympanic membrane is retracted. A middle ear effusion is present.   Left Ear: Hearing, tympanic membrane and external ear normal.   Nose: Nose normal.   Mouth/Throat: Uvula is midline. No oropharyngeal exudate or posterior oropharyngeal erythema.   Eyes: Pupils are equal, round, and reactive to light. Conjunctivae and EOM are normal. Right eye exhibits no discharge. Left eye exhibits no discharge. No scleral icterus.   Neck: Neck supple. No JVD present. No tracheal deviation present. No thyromegaly present.   Cardiovascular: Normal rate, regular rhythm, normal heart sounds and intact distal pulses.  Exam reveals no gallop and no friction rub.    No murmur heard.  Pulmonary/Chest: Effort normal and breath sounds normal. No stridor. No respiratory distress. She has no wheezes. She has no rales.   Abdominal: Soft. Bowel sounds are normal. She exhibits no distension. There is no tenderness.   Lymphadenopathy:     She has no cervical adenopathy.   Neurological: She is alert and oriented to person, place, and time. She has normal reflexes.   Skin: Skin is warm. She is not diaphoretic.   Psychiatric: She has a normal mood and affect. Her behavior is normal. Judgment and thought content normal.       WBC   Date Value Ref Range Status   03/19/2018 10.00 (H) 3.20 - 9.80 10*3/mm3 Final     RBC   Date Value " Ref Range Status   03/19/2018 4.36 3.77 - 5.16 10*6/mm3 Final     Hemoglobin   Date Value Ref Range Status   03/19/2018 11.4 (L) 12.0 - 15.5 g/dL Final     Hematocrit   Date Value Ref Range Status   03/19/2018 36.2 35.0 - 45.0 % Final     MCV   Date Value Ref Range Status   03/19/2018 83.0 80.0 - 98.0 fL Final     MCH   Date Value Ref Range Status   03/19/2018 26.1 (L) 26.5 - 34.0 pg Final     MCHC   Date Value Ref Range Status   03/19/2018 31.5 31.4 - 36.0 g/dL Final     RDW   Date Value Ref Range Status   03/19/2018 14.5 11.5 - 14.5 % Final     RDW-SD   Date Value Ref Range Status   03/19/2018 43.5 36.4 - 46.3 fl Final     MPV   Date Value Ref Range Status   03/19/2018 10.5 8.0 - 12.0 fL Final     Platelets   Date Value Ref Range Status   03/19/2018 360 150 - 450 10*3/mm3 Final     Neutrophil %   Date Value Ref Range Status   03/19/2018 75.9 37.0 - 80.0 % Final     Lymphocyte %   Date Value Ref Range Status   03/19/2018 15.0 10.0 - 50.0 % Final     Monocyte %   Date Value Ref Range Status   03/19/2018 5.8 0.0 - 12.0 % Final     Eosinophil %   Date Value Ref Range Status   03/19/2018 2.4 0.0 - 7.0 % Final     Basophil %   Date Value Ref Range Status   03/19/2018 0.7 0.0 - 2.0 % Final     Immature Grans %   Date Value Ref Range Status   03/19/2018 0.2 0.0 - 0.5 % Final     Neutrophils, Absolute   Date Value Ref Range Status   03/19/2018 7.59 2.00 - 8.60 10*3/mm3 Final     Lymphocytes, Absolute   Date Value Ref Range Status   03/19/2018 1.50 0.60 - 4.20 10*3/mm3 Final     Monocytes, Absolute   Date Value Ref Range Status   03/19/2018 0.58 0.00 - 0.90 10*3/mm3 Final     Eosinophils, Absolute   Date Value Ref Range Status   03/19/2018 0.24 0.00 - 0.70 10*3/mm3 Final     Basophils, Absolute   Date Value Ref Range Status   03/19/2018 0.07 0.00 - 0.20 10*3/mm3 Final     Immature Grans, Absolute   Date Value Ref Range Status   03/19/2018 0.02 0.00 - 0.02 10*3/mm3 Final     nRBC   Date Value Ref Range Status   10/15/2015  0.0 0.0 - 0.2 % Final   10/15/2015 0.000 x1000/uL Final        Assessment/Plan   Assessment/Plan:     Do was seen today for fever, sore throat, earache and cough.    Diagnoses and all orders for this visit:    Viral upper respiratory tract infection  Comments:  Supportive care   Expectorants, nasal decongestants, & NSAIDS for symptom control.  RTC if sx do not improve or worsen           Diagnosis Plan   1. Viral upper respiratory tract infection      Supportive care   Expectorants, nasal decongestants, & NSAIDS for symptom control.  RTC if sx do not improve or worsen         Follow-up:     Return if symptoms worsen or fail to improve.    GOALS:  Goals     • Recover from flu            Barriers: none            Preventative:  Health Maintenance   Topic Date Due   • ANNUAL PHYSICAL  07/20/1988   • TDAP/TD VACCINES (1 - Tdap) 07/20/2004   • PAP SMEAR  02/02/2018   • INFLUENZA VACCINE  08/01/2018       She  reports that she has never smoked. She has never used smokeless tobacco.    She  reports that she does not drink alcohol.    She  reports that she does not use drugs.    Patient's Body mass index is 49.93 kg/m². BMI is above normal parameters. Recommendations include: educational material, exercise counseling and nutrition counseling.         RISK SCORE: 2    David Malave Jr., M.D.  PGY2  Family Practice Resident  [address] [place] [address]  Phone: [phone]  Fax: [phone]      This document has been electronically signed by David Malave Jr, MD on 09/19/18 4:16 PM

## 2018-09-20 NOTE — PATIENT INSTRUCTIONS
Viral Respiratory Infection  A respiratory infection is an illness that affects part of the respiratory system, such as the lungs, nose, or throat. Most respiratory infections are caused by either viruses or bacteria. A respiratory infection that is caused by a virus is called a viral respiratory infection.  Common types of viral respiratory infections include:  · A cold.  · The flu (influenza).  · A respiratory syncytial virus (RSV) infection.    How do I know if I have a viral respiratory infection?  Most viral respiratory infections cause:  · A stuffy or runny nose.  · Yellow or green nasal discharge.  · A cough.  · Sneezing.  · Fatigue.  · Achy muscles.  · A sore throat.  · Sweating or chills.  · A fever.  · A headache.    How are viral respiratory infections treated?  If influenza is diagnosed early, it may be treated with an antiviral medicine that shortens the length of time a person has symptoms. Symptoms of viral respiratory infections may be treated with over-the-counter and prescription medicines, such as:  · Expectorants. These make it easier to cough up mucus.  · Decongestant nasal sprays.    Health care providers do not prescribe antibiotic medicines for viral infections. This is because antibiotics are designed to kill bacteria. They have no effect on viruses.  How do I know if I should stay home from work or school?  To avoid exposing others to your respiratory infection, stay home if you have:  · A fever.  · A persistent cough.  · A sore throat.  · A runny nose.  · Sneezing.  · Muscles aches.  · Headaches.  · Fatigue.  · Weakness.  · Chills.  · Sweating.  · Nausea.    Follow these instructions at home:  · Rest as much as possible.  · Take over-the-counter and prescription medicines only as told by your health care provider.  · Drink enough fluid to keep your urine clear or pale yellow. This helps prevent dehydration and helps loosen up mucus.  · Gargle with a salt-water mixture 3-4 times per day or  as needed. To make a salt-water mixture, completely dissolve ½-1 tsp of salt in 1 cup of warm water.  · Use nose drops made from salt water to ease congestion and soften raw skin around your nose.  · Do not drink alcohol.  · Do not use tobacco products, including cigarettes, chewing tobacco, and e-cigarettes. If you need help quitting, ask your health care provider.  Contact a health care provider if:  · Your symptoms last for 10 days or longer.  · Your symptoms get worse over time.  · You have a fever.  · You have severe sinus pain in your face or forehead.  · The glands in your jaw or neck become very swollen.  Get help right away if:  · You feel pain or pressure in your chest.  · You have shortness of breath.  · You faint or feel like you will faint.  · You have severe and persistent vomiting.  · You feel confused or disoriented.  This information is not intended to replace advice given to you by your health care provider. Make sure you discuss any questions you have with your health care provider.  Document Released: 09/27/2006 Document Revised: 05/25/2017 Document Reviewed: 05/25/2016  [employer] Interactive Patient Education © 2018 [employer]

## 2018-09-26 RX ORDER — AZITHROMYCIN 250 MG/1
TABLET, FILM COATED ORAL
Qty: 6 TABLET | Refills: 0 | Status: SHIPPED | OUTPATIENT
Start: 2018-09-26 | End: 2019-03-12

## 2018-09-26 NOTE — PROGRESS NOTES
I have reviewed the notes, assessments, and/or procedures performed by the resident, I concur with her/his documentation of Do Miles.

## 2018-10-24 DIAGNOSIS — I10 ESSENTIAL HYPERTENSION: ICD-10-CM

## 2018-10-24 RX ORDER — CLONIDINE HYDROCHLORIDE 0.2 MG/1
0.2 TABLET ORAL
Qty: 30 TABLET | Refills: 3 | Status: SHIPPED | OUTPATIENT
Start: 2018-10-24 | End: 2019-02-20

## 2018-10-24 RX ORDER — LABETALOL 200 MG/1
200 TABLET, FILM COATED ORAL 2 TIMES DAILY
Qty: 60 TABLET | Refills: 3 | Status: SHIPPED | OUTPATIENT
Start: 2018-10-24 | End: 2019-02-20

## 2018-10-24 RX ORDER — AMLODIPINE BESYLATE 10 MG/1
10 TABLET ORAL DAILY
Qty: 30 TABLET | Refills: 3 | Status: SHIPPED | OUTPATIENT
Start: 2018-10-24 | End: 2019-02-20

## 2018-11-23 DIAGNOSIS — I10 ESSENTIAL HYPERTENSION: ICD-10-CM

## 2018-11-26 RX ORDER — HYDRALAZINE HYDROCHLORIDE 10 MG/1
10 TABLET, FILM COATED ORAL 2 TIMES DAILY
Qty: 60 TABLET | Refills: 3 | Status: SHIPPED | OUTPATIENT
Start: 2018-11-26 | End: 2019-04-17

## 2019-02-20 DIAGNOSIS — I10 ESSENTIAL HYPERTENSION: ICD-10-CM

## 2019-02-20 RX ORDER — CLONIDINE HYDROCHLORIDE 0.2 MG/1
0.2 TABLET ORAL
Qty: 30 TABLET | Refills: 3 | Status: SHIPPED | OUTPATIENT
Start: 2019-02-20 | End: 2019-06-19

## 2019-02-20 RX ORDER — LABETALOL 200 MG/1
200 TABLET, FILM COATED ORAL 2 TIMES DAILY
Qty: 60 TABLET | Refills: 3 | Status: SHIPPED | OUTPATIENT
Start: 2019-02-20 | End: 2019-06-19

## 2019-02-20 RX ORDER — AMLODIPINE BESYLATE 10 MG/1
10 TABLET ORAL DAILY
Qty: 30 TABLET | Refills: 3 | Status: SHIPPED | OUTPATIENT
Start: 2019-02-20 | End: 2019-06-19

## 2019-03-12 ENCOUNTER — OFFICE VISIT (OUTPATIENT)
Dept: FAMILY MEDICINE CLINIC | Facility: CLINIC | Age: 34
End: 2019-03-12

## 2019-03-12 VITALS
HEIGHT: 62 IN | WEIGHT: 293 LBS | SYSTOLIC BLOOD PRESSURE: 128 MMHG | BODY MASS INDEX: 53.92 KG/M2 | DIASTOLIC BLOOD PRESSURE: 74 MMHG

## 2019-03-12 DIAGNOSIS — F41.9 ANXIETY: ICD-10-CM

## 2019-03-12 DIAGNOSIS — Z13.29 SCREENING FOR THYROID DISORDER: ICD-10-CM

## 2019-03-12 DIAGNOSIS — E66.01 CLASS 3 SEVERE OBESITY DUE TO EXCESS CALORIES WITHOUT SERIOUS COMORBIDITY WITH BODY MASS INDEX (BMI) OF 50.0 TO 59.9 IN ADULT (HCC): Primary | ICD-10-CM

## 2019-03-12 DIAGNOSIS — Z13.220 SCREENING FOR HYPERLIPIDEMIA: ICD-10-CM

## 2019-03-12 DIAGNOSIS — I10 ESSENTIAL HYPERTENSION: ICD-10-CM

## 2019-03-12 PROBLEM — E66.9 OBESITY: Status: ACTIVE | Noted: 2019-03-12

## 2019-03-12 PROCEDURE — 99214 OFFICE O/P EST MOD 30 MIN: CPT | Performed by: NURSE PRACTITIONER

## 2019-03-12 RX ORDER — BUPROPION HYDROCHLORIDE 75 MG/1
75 TABLET ORAL DAILY
Qty: 30 TABLET | Refills: 2 | Status: SHIPPED | OUTPATIENT
Start: 2019-03-12 | End: 2019-05-15

## 2019-03-12 NOTE — PROGRESS NOTES
Subjective   Do Miles is a 33 y.o. female. Complaints of weight gain.  Was going to try the contrave but her insurance would not cover it in the past but her insurance has changed.  Also states she is exhibiting some anxiety since her cousin passed in October.      Obesity   This is a chronic problem. The current episode started more than 1 year ago. The problem occurs constantly. The problem has been gradually worsening. Pertinent negatives include no chest pain, chills or fever.   Anxiety   Presents for initial visit. Onset was 6 to 12 months ago. The problem has been rapidly worsening. Patient reports no chest pain. The severity of symptoms is severe. The symptoms are aggravated by family issues.     There are no known risk factors. Her past medical history is significant for anxiety/panic attacks. Past treatments include nothing.   Hypertension   This is a chronic problem. The current episode started more than 1 year ago. The problem is unchanged. The problem is uncontrolled. Associated symptoms include anxiety. Pertinent negatives include no chest pain. There are no associated agents to hypertension. Risk factors for coronary artery disease include family history, obesity, sedentary lifestyle and stress. Current antihypertension treatment includes ACE inhibitors and beta blockers. The current treatment provides moderate improvement. There are no compliance problems.         The following portions of the patient's history were reviewed and updated as appropriate:   Current Outpatient Medications   Medication Sig Dispense Refill   • amLODIPine (NORVASC) 10 MG tablet Take 1 tablet by mouth Daily. 30 tablet 3   • CloNIDine (CATAPRES) 0.2 MG tablet Take 1 tablet by mouth every night at bedtime. 30 tablet 3   • hydrALAZINE (APRESOLINE) 10 MG tablet Take 1 tablet by mouth 2 (Two) Times a Day. 60 tablet 3   • labetalol (NORMODYNE) 200 MG tablet Take 1 tablet by mouth 2 (Two) Times a Day. 60 tablet 3   •  "levonorgestrel (MIRENA) 20 MCG/24HR IUD 1 each by Intrauterine route 1 (One) Time.     • buPROPion (WELLBUTRIN) 75 MG tablet Take 1 tablet by mouth Daily. 30 tablet 2   • Liraglutide -Weight Management 18 MG/3ML solution pen-injector Inject 0.6 mg under the skin daily x 1 week. Increase dose by 0.6 mg on a weekly basis as tolerated until max dose of 3 mg daily is reached. 15 mL 3     No current facility-administered medications for this visit.      Current Outpatient Medications on File Prior to Visit   Medication Sig   • amLODIPine (NORVASC) 10 MG tablet Take 1 tablet by mouth Daily.   • CloNIDine (CATAPRES) 0.2 MG tablet Take 1 tablet by mouth every night at bedtime.   • hydrALAZINE (APRESOLINE) 10 MG tablet Take 1 tablet by mouth 2 (Two) Times a Day.   • labetalol (NORMODYNE) 200 MG tablet Take 1 tablet by mouth 2 (Two) Times a Day.   • levonorgestrel (MIRENA) 20 MCG/24HR IUD 1 each by Intrauterine route 1 (One) Time.   • [DISCONTINUED] azithromycin (ZITHROMAX Z-MAHESH) 250 MG tablet Take 2 tablets the first day, then 1 tablet daily for 4 days.     No current facility-administered medications on file prior to visit.      She is allergic to latex and adhesive tape..    Review of Systems   Constitutional: Negative for chills and fever.   HENT: Negative.    Respiratory: Negative.    Cardiovascular: Negative.  Negative for chest pain and leg swelling.   Gastrointestinal: Negative.    Musculoskeletal: Negative.        Objective    Visit Vitals  /74   Ht 157.5 cm (62\")   Wt (!) 136 kg (300 lb 1.6 oz)   LMP 02/14/2019 (Approximate)   BMI 54.89 kg/m²       Physical Exam   Constitutional: She is oriented to person, place, and time. She appears well-developed and well-nourished.   HENT:   Head: Normocephalic.   Eyes: EOM are normal. Pupils are equal, round, and reactive to light.   Neck: Normal range of motion. Neck supple.   Cardiovascular: Normal rate, regular rhythm and normal heart sounds.   Pulmonary/Chest: Effort " normal and breath sounds normal.   Abdominal: Soft. Bowel sounds are normal.   Musculoskeletal: Normal range of motion.   Neurological: She is alert and oriented to person, place, and time.   Skin: Skin is warm.   Psychiatric: Her behavior is normal. Her mood appears anxious.   Tearful during exam   Nursing note and vitals reviewed.      Assessment/Plan   Problems Addressed this Visit        Digestive    Obesity - Primary    Relevant Medications    Liraglutide -Weight Management 18 MG/3ML solution pen-injector       Other    Anxiety    Relevant Medications    buPROPion (WELLBUTRIN) 75 MG tablet      Other Visit Diagnoses     Screening for thyroid disorder        Relevant Orders    TSH    Essential hypertension        Relevant Orders    CBC & Differential    Comprehensive Metabolic Panel    Screening for hyperlipidemia        Relevant Orders    Lipid panel        1. Obesity:   Begin taking Saxenda as ordered  Instructed that side effects may include but are not limited to GI discomfort, bloating, constipation, diarrhea, indigestion and injection site redness  Educated that restricting calories to no more than 2000 calories per day  If chooses to do carbohydrate counting she should be under 75 carbs per day   Educate to include exercise at least 150 minutes per week    2. Anxiety  Begin taking Wellbutrin as prescribed  Educated on possible side of this medication including but not limited possible suicidal ideations  Encouraged to discontinue medication immediately if suicidal ideations occur and seek emergency medical treatment    3.  Hypertension:  Complete CBC and CMP as ordered and will notify results when available  Continue on amlodipine, hydralazine, and labetalol as ordered  Continue to adhere to low-sodium diet no more than 2000 mg/day    4.  Screening for thyroid disorder:  Complete TSH will notify of results when available    5.  Screening for hyperlipidemia:  Complete lipid panel will notify of results  when available      Continue on current medications as previously prescribed   Return in about 4 weeks (around 4/9/2019).      This document has been electronically signed by JEWELS Sierra on March 12, 2019 4:11 PM

## 2019-03-13 ENCOUNTER — TELEPHONE (OUTPATIENT)
Dept: FAMILY MEDICINE CLINIC | Facility: CLINIC | Age: 34
End: 2019-03-13

## 2019-03-13 RX ORDER — PEN NEEDLE, DIABETIC 31 GX5/16"
NEEDLE, DISPOSABLE MISCELLANEOUS
Qty: 100 EACH | Refills: 1 | Status: SHIPPED | OUTPATIENT
Start: 2019-03-13 | End: 2022-04-05

## 2019-03-13 NOTE — TELEPHONE ENCOUNTER
Pt called and said that she needs script for her needles for her Saxenda sent to [facility]

## 2019-03-14 ENCOUNTER — LAB (OUTPATIENT)
Dept: LAB | Facility: HOSPITAL | Age: 34
End: 2019-03-14

## 2019-03-14 DIAGNOSIS — D64.9 LOW HEMOGLOBIN AND LOW HEMATOCRIT: Primary | ICD-10-CM

## 2019-03-14 DIAGNOSIS — Z13.220 SCREENING FOR HYPERLIPIDEMIA: ICD-10-CM

## 2019-03-14 DIAGNOSIS — I10 ESSENTIAL HYPERTENSION: ICD-10-CM

## 2019-03-14 DIAGNOSIS — Z13.29 SCREENING FOR THYROID DISORDER: ICD-10-CM

## 2019-03-14 LAB
ALBUMIN SERPL-MCNC: 4 G/DL (ref 3.4–4.8)
ALBUMIN/GLOB SERPL: 1.2 G/DL (ref 1.1–1.8)
ALP SERPL-CCNC: 105 U/L (ref 38–126)
ALT SERPL W P-5'-P-CCNC: 23 U/L (ref 9–52)
ANION GAP SERPL CALCULATED.3IONS-SCNC: 9 MMOL/L (ref 5–15)
ARTICHOKE IGE QN: 99 MG/DL (ref 1–129)
AST SERPL-CCNC: 34 U/L (ref 14–36)
BASOPHILS # BLD AUTO: 0.05 10*3/MM3 (ref 0–0.2)
BASOPHILS NFR BLD AUTO: 0.5 % (ref 0–1.5)
BILIRUB SERPL-MCNC: 0.4 MG/DL (ref 0.2–1.3)
BUN BLD-MCNC: 15 MG/DL (ref 7–21)
BUN/CREAT SERPL: 20.5 (ref 7–25)
CALCIUM SPEC-SCNC: 9.7 MG/DL (ref 8.4–10.2)
CHLORIDE SERPL-SCNC: 102 MMOL/L (ref 95–110)
CHOLEST SERPL-MCNC: 161 MG/DL (ref 0–199)
CO2 SERPL-SCNC: 25 MMOL/L (ref 22–31)
CREAT BLD-MCNC: 0.73 MG/DL (ref 0.5–1)
DEPRECATED RDW RBC AUTO: 44 FL (ref 37–54)
EOSINOPHIL # BLD AUTO: 0.14 10*3/MM3 (ref 0–0.4)
EOSINOPHIL NFR BLD AUTO: 1.5 % (ref 0.3–6.2)
ERYTHROCYTE [DISTWIDTH] IN BLOOD BY AUTOMATED COUNT: 17.1 % (ref 12.3–15.4)
FERRITIN SERPL-MCNC: 11.5 NG/ML (ref 6.2–137)
GFR SERPL CREATININE-BSD FRML MDRD: 92 ML/MIN/1.73 (ref 64–149)
GLOBULIN UR ELPH-MCNC: 3.3 GM/DL (ref 2.3–3.5)
GLUCOSE BLD-MCNC: 84 MG/DL (ref 60–100)
HCT VFR BLD AUTO: 31.5 % (ref 34–46.6)
HDLC SERPL-MCNC: 43 MG/DL (ref 60–200)
HGB BLD-MCNC: 9.4 G/DL (ref 12–15.9)
IMM GRANULOCYTES # BLD AUTO: 0.03 10*3/MM3 (ref 0–0.05)
IMM GRANULOCYTES NFR BLD AUTO: 0.3 % (ref 0–0.5)
IRON 24H UR-MRATE: 21 MCG/DL (ref 37–170)
IRON SATN MFR SERPL: 6 % (ref 15–50)
LDLC/HDLC SERPL: 2.48 {RATIO} (ref 0–3.22)
LYMPHOCYTES # BLD AUTO: 1.02 10*3/MM3 (ref 0.7–3.1)
LYMPHOCYTES NFR BLD AUTO: 11.2 % (ref 19.6–45.3)
MCH RBC QN AUTO: 21.5 PG (ref 26.6–33)
MCHC RBC AUTO-ENTMCNC: 29.8 G/DL (ref 31.5–35.7)
MCV RBC AUTO: 71.9 FL (ref 79–97)
MONOCYTES # BLD AUTO: 0.69 10*3/MM3 (ref 0.1–0.9)
MONOCYTES NFR BLD AUTO: 7.5 % (ref 5–12)
NEUTROPHILS # BLD AUTO: 7.21 10*3/MM3 (ref 1.4–7)
NEUTROPHILS NFR BLD AUTO: 79 % (ref 42.7–76)
NRBC BLD AUTO-RTO: 0 /100 WBC (ref 0–0)
PLAT MORPH BLD: NORMAL
PLATELET # BLD AUTO: 384 10*3/MM3 (ref 140–450)
PMV BLD AUTO: 10.7 FL (ref 6–12)
POTASSIUM BLD-SCNC: 3.9 MMOL/L (ref 3.5–5.1)
PROT SERPL-MCNC: 7.3 G/DL (ref 6.3–8.6)
RBC # BLD AUTO: 4.38 10*6/MM3 (ref 3.77–5.28)
RBC MORPH BLD: NORMAL
SODIUM BLD-SCNC: 136 MMOL/L (ref 137–145)
TIBC SERPL-MCNC: 373 MCG/DL (ref 265–497)
TRIGL SERPL-MCNC: 57 MG/DL (ref 20–199)
TSH SERPL DL<=0.05 MIU/L-ACNC: 2.57 MIU/ML (ref 0.46–4.68)
VIT B12 BLD-MCNC: 323 PG/ML (ref 239–931)
WBC MORPH BLD: NORMAL
WBC NRBC COR # BLD: 9.14 10*3/MM3 (ref 3.4–10.8)

## 2019-03-14 PROCEDURE — 82728 ASSAY OF FERRITIN: CPT | Performed by: NURSE PRACTITIONER

## 2019-03-14 PROCEDURE — 83540 ASSAY OF IRON: CPT | Performed by: NURSE PRACTITIONER

## 2019-03-14 PROCEDURE — 85007 BL SMEAR W/DIFF WBC COUNT: CPT

## 2019-03-14 PROCEDURE — 80061 LIPID PANEL: CPT

## 2019-03-14 PROCEDURE — 83550 IRON BINDING TEST: CPT | Performed by: NURSE PRACTITIONER

## 2019-03-14 PROCEDURE — 36415 COLL VENOUS BLD VENIPUNCTURE: CPT

## 2019-03-14 PROCEDURE — 84443 ASSAY THYROID STIM HORMONE: CPT

## 2019-03-14 PROCEDURE — 80053 COMPREHEN METABOLIC PANEL: CPT

## 2019-03-14 PROCEDURE — 85025 COMPLETE CBC W/AUTO DIFF WBC: CPT

## 2019-03-14 PROCEDURE — 82607 VITAMIN B-12: CPT | Performed by: NURSE PRACTITIONER

## 2019-03-14 RX ORDER — LANOLIN ALCOHOL/MO/W.PET/CERES
325 CREAM (GRAM) TOPICAL
Qty: 30 TABLET | Refills: 11 | Status: SHIPPED | OUTPATIENT
Start: 2019-03-14 | End: 2020-03-31 | Stop reason: SDUPTHER

## 2019-03-15 ENCOUNTER — TELEPHONE (OUTPATIENT)
Dept: FAMILY MEDICINE CLINIC | Facility: CLINIC | Age: 34
End: 2019-03-15

## 2019-03-15 NOTE — TELEPHONE ENCOUNTER
-Per JEWELS Murdock, Ms. Miles has been called with recent lab results & recommendations.  Continue current medications and follow-up as planned or sooner if any problems.    Just and LORETTA,  Her Saxenda was Denied, because no other weight loss medications have been tried.    You all should have the PA on Monday      ---- Message from JEWELS Sierra sent at 3/14/2019  1:00 PM CDT -----  Her thyroid, total cholesterol and triglycerides were good.  Good cholesterol slightly low at 43.  She needs to increase this number by increasing her exercising such as walking.  Sugar sodium and potassium were all fine but her hemoglobin was low at 9.4 and her hematocrit is at 31.5.  It appears as if she is always had a chronic anemia.  I have ordered some further lab testing and also send in a prescription for iron supplement that she needs to begin immediately.  Iron can be constipating and calls her stools to be dark.  We need to recheck her blood count in 1 month.

## 2019-03-15 NOTE — PROGRESS NOTES
Per JEWELS Murdock, Ms. Miles has been called with recent lab results & recommendations.  Continue current medications and follow-up as planned or sooner if any problems.    Nathan and LORETTA,  Her Saxenda was Denied, because no other weight loss medications have been tried.    You all should have the PA on Monday

## 2019-03-18 DIAGNOSIS — E66.01 CLASS 3 SEVERE OBESITY DUE TO EXCESS CALORIES WITHOUT SERIOUS COMORBIDITY WITH BODY MASS INDEX (BMI) OF 50.0 TO 59.9 IN ADULT (HCC): Primary | ICD-10-CM

## 2019-04-17 DIAGNOSIS — I10 ESSENTIAL HYPERTENSION: ICD-10-CM

## 2019-04-17 RX ORDER — HYDRALAZINE HYDROCHLORIDE 10 MG/1
10 TABLET, FILM COATED ORAL 2 TIMES DAILY
Qty: 60 TABLET | Refills: 3 | Status: SHIPPED | OUTPATIENT
Start: 2019-04-17 | End: 2019-08-21

## 2019-05-14 ENCOUNTER — OFFICE VISIT (OUTPATIENT)
Dept: FAMILY MEDICINE CLINIC | Facility: CLINIC | Age: 34
End: 2019-05-14

## 2019-05-14 VITALS
SYSTOLIC BLOOD PRESSURE: 146 MMHG | WEIGHT: 293 LBS | HEIGHT: 62 IN | DIASTOLIC BLOOD PRESSURE: 92 MMHG | BODY MASS INDEX: 53.92 KG/M2

## 2019-05-14 DIAGNOSIS — I10 ESSENTIAL HYPERTENSION: Primary | ICD-10-CM

## 2019-05-14 DIAGNOSIS — E66.01 MORBID (SEVERE) OBESITY DUE TO EXCESS CALORIES (HCC): ICD-10-CM

## 2019-05-14 DIAGNOSIS — F41.9 ANXIETY: ICD-10-CM

## 2019-05-14 PROCEDURE — 99214 OFFICE O/P EST MOD 30 MIN: CPT | Performed by: NURSE PRACTITIONER

## 2019-05-14 NOTE — PROGRESS NOTES
"Subjective   Do Miles is a 33 y.o. female.   Currently on Belviq for weight loss and has lost five pounds.  Anxiety is a little worse right now 'because umesh is out of town for two weeks and of course everything always go wrong when he his gone.\"     Obesity   This is a chronic problem. The current episode started more than 1 year ago. The problem occurs constantly. The problem has been gradually worsening. Pertinent negatives include no chest pain, chills, diaphoresis, fatigue or fever.   Anxiety   Presents for initial visit. Onset was 6 to 12 months ago. The problem has been rapidly worsening. Patient reports no chest pain or confusion. The severity of symptoms is severe. The symptoms are aggravated by family issues.     There are no known risk factors. Her past medical history is significant for anxiety/panic attacks. Past treatments include nothing.   Hypertension   This is a chronic problem. The current episode started more than 1 year ago. The problem is unchanged. The problem is uncontrolled. Associated symptoms include anxiety. Pertinent negatives include no chest pain. There are no associated agents to hypertension. Risk factors for coronary artery disease include family history, obesity, sedentary lifestyle and stress. Current antihypertension treatment includes ACE inhibitors and beta blockers. The current treatment provides moderate improvement. There are no compliance problems.         The following portions of the patient's history were reviewed and updated as appropriate:   Current Outpatient Medications   Medication Sig Dispense Refill   • amLODIPine (NORVASC) 10 MG tablet Take 1 tablet by mouth Daily. 30 tablet 3   • CloNIDine (CATAPRES) 0.2 MG tablet Take 1 tablet by mouth every night at bedtime. 30 tablet 3   • ferrous sulfate 325 (65 FE) MG EC tablet Take 1 tablet by mouth Daily With Breakfast. (Patient taking differently: Take 325 mg by mouth 3 (Three) Times a Day With Meals.) 30 " "tablet 11   • hydrALAZINE (APRESOLINE) 10 MG tablet Take 1 tablet by mouth 2 (Two) Times a Day. 60 tablet 3   • Insulin Pen Needle (PEN NEEDLES 5/16\") 31G X 8 MM misc Use with Saxenda daily. 100 each 1   • labetalol (NORMODYNE) 200 MG tablet Take 1 tablet by mouth 2 (Two) Times a Day. 60 tablet 3   • levonorgestrel (MIRENA) 20 MCG/24HR IUD 1 each by Intrauterine route 1 (One) Time.     • Lorcaserin HCl ER 20 MG tablet sustained-release 24 hour Take 1 tablet by mouth Daily. 30 tablet 2   • buPROPion (WELLBUTRIN) 75 MG tablet Take 1 tablet by mouth Daily. 30 tablet 2     No current facility-administered medications for this visit.      Current Outpatient Medications on File Prior to Visit   Medication Sig   • amLODIPine (NORVASC) 10 MG tablet Take 1 tablet by mouth Daily.   • CloNIDine (CATAPRES) 0.2 MG tablet Take 1 tablet by mouth every night at bedtime.   • ferrous sulfate 325 (65 FE) MG EC tablet Take 1 tablet by mouth Daily With Breakfast. (Patient taking differently: Take 325 mg by mouth 3 (Three) Times a Day With Meals.)   • hydrALAZINE (APRESOLINE) 10 MG tablet Take 1 tablet by mouth 2 (Two) Times a Day.   • Insulin Pen Needle (PEN NEEDLES 5/16\") 31G X 8 MM misc Use with Saxenda daily.   • labetalol (NORMODYNE) 200 MG tablet Take 1 tablet by mouth 2 (Two) Times a Day.   • levonorgestrel (MIRENA) 20 MCG/24HR IUD 1 each by Intrauterine route 1 (One) Time.   • Lorcaserin HCl ER 20 MG tablet sustained-release 24 hour Take 1 tablet by mouth Daily.   • [DISCONTINUED] buPROPion (WELLBUTRIN) 75 MG tablet Take 1 tablet by mouth Daily.     No current facility-administered medications on file prior to visit.      She is allergic to latex and adhesive tape..    Review of Systems   Constitutional: Negative for chills, diaphoresis, fatigue and fever.   HENT: Negative.    Eyes: Negative.    Respiratory: Negative.    Cardiovascular: Negative.  Negative for chest pain.   Gastrointestinal: Negative.    Genitourinary: Negative.  " "  Musculoskeletal: Negative.    Skin: Negative.    Neurological: Negative.    Psychiatric/Behavioral: Negative.  Negative for confusion.       Objective    Visit Vitals  /92   Ht 157.5 cm (62\")   Wt 134 kg (295 lb 8 oz)   LMP 05/10/2019 (Exact Date)   BMI 54.05 kg/m²       Physical Exam   Constitutional: She is oriented to person, place, and time. She appears well-developed and well-nourished.   HENT:   Head: Normocephalic.   Right Ear: External ear normal.   Left Ear: External ear normal.   Eyes: EOM are normal. Pupils are equal, round, and reactive to light.   Neck: Normal range of motion. Neck supple.   Cardiovascular: Normal rate, regular rhythm and normal heart sounds.   Pulmonary/Chest: Effort normal and breath sounds normal.   Abdominal: Soft. Bowel sounds are normal.   Musculoskeletal: Normal range of motion.   Neurological: She is alert and oriented to person, place, and time.   Skin: Skin is warm. Capillary refill takes less than 2 seconds.   Psychiatric: She has a normal mood and affect. Her behavior is normal.   Nursing note and vitals reviewed.      Assessment/Plan   Problems Addressed this Visit        Other    Anxiety      Other Visit Diagnoses     Essential hypertension    -  Primary    Morbid (severe) obesity due to excess calories (CMS/[facility])            1.  Hypertension:  Continue on Norvasc and Clonidine as previsouly prescribed   Have blood pressure rechecked at work by nurse later towards the end of the week in the middle of next week after stress levels have reduced before making changes to BP medication  Continue on reduce sodium intake of no more than 2000 mg/day    2.  Obesity:   Continue on Belviq as previously prescribed   EncouragedInstructed that side effects may include but are not limited to GI discomfort, bloating, constipation, diarrhea, indigestion   Educated that restricting calories to no more than 2000 calories per day  If chooses to do carbohydrate counting she should be " under 75 carbs per day   Educate to include exercise at least 150 minutes per week  Encouraged food journal for self accountability     3. Anxiety  Continue on Wellbutrin as previously prescribed   Educated on possible side of this medication including but not limited possible suicidal ideations  Encouraged to discontinue medication immediately if suicidal ideations occur and seek emergency medical treatment     Continue on current medications as previously prescribed   Return in about 3 months (around 8/14/2019) for Recheck.        This document has been electronically signed by JEWELS Sierra on May 15, 2019 12:43 PM

## 2019-05-15 DIAGNOSIS — F41.9 ANXIETY: ICD-10-CM

## 2019-05-15 RX ORDER — BUPROPION HYDROCHLORIDE 75 MG/1
75 TABLET ORAL DAILY
Qty: 30 TABLET | Refills: 2 | Status: SHIPPED | OUTPATIENT
Start: 2019-05-15 | End: 2019-08-21

## 2019-06-19 DIAGNOSIS — I10 ESSENTIAL HYPERTENSION: ICD-10-CM

## 2019-06-19 RX ORDER — LABETALOL 200 MG/1
200 TABLET, FILM COATED ORAL 2 TIMES DAILY
Qty: 60 TABLET | Refills: 3 | Status: SHIPPED | OUTPATIENT
Start: 2019-06-19 | End: 2019-10-23

## 2019-06-19 RX ORDER — AMLODIPINE BESYLATE 10 MG/1
10 TABLET ORAL DAILY
Qty: 30 TABLET | Refills: 3 | Status: SHIPPED | OUTPATIENT
Start: 2019-06-19 | End: 2019-10-23

## 2019-06-19 RX ORDER — CLONIDINE HYDROCHLORIDE 0.2 MG/1
0.2 TABLET ORAL
Qty: 30 TABLET | Refills: 3 | Status: SHIPPED | OUTPATIENT
Start: 2019-06-19 | End: 2019-10-23

## 2019-08-21 DIAGNOSIS — I10 ESSENTIAL HYPERTENSION: ICD-10-CM

## 2019-08-21 DIAGNOSIS — F41.9 ANXIETY: ICD-10-CM

## 2019-08-21 RX ORDER — BUPROPION HYDROCHLORIDE 75 MG/1
75 TABLET ORAL DAILY
Qty: 30 TABLET | Refills: 1 | Status: SHIPPED | OUTPATIENT
Start: 2019-08-21 | End: 2019-10-23

## 2019-08-21 RX ORDER — HYDRALAZINE HYDROCHLORIDE 10 MG/1
10 TABLET, FILM COATED ORAL 2 TIMES DAILY
Qty: 60 TABLET | Refills: 1 | Status: SHIPPED | OUTPATIENT
Start: 2019-08-21 | End: 2019-10-23

## 2019-10-23 DIAGNOSIS — I10 ESSENTIAL HYPERTENSION: ICD-10-CM

## 2019-10-23 DIAGNOSIS — F41.9 ANXIETY: ICD-10-CM

## 2019-10-23 RX ORDER — CLONIDINE HYDROCHLORIDE 0.2 MG/1
0.2 TABLET ORAL
Qty: 30 TABLET | Refills: 3 | Status: SHIPPED | OUTPATIENT
Start: 2019-10-23 | End: 2020-02-18

## 2019-10-23 RX ORDER — BUPROPION HYDROCHLORIDE 75 MG/1
75 TABLET ORAL DAILY
Qty: 30 TABLET | Refills: 1 | Status: SHIPPED | OUTPATIENT
Start: 2019-10-23 | End: 2019-12-18

## 2019-10-23 RX ORDER — HYDRALAZINE HYDROCHLORIDE 10 MG/1
10 TABLET, FILM COATED ORAL 2 TIMES DAILY
Qty: 60 TABLET | Refills: 1 | Status: SHIPPED | OUTPATIENT
Start: 2019-10-23 | End: 2019-12-18

## 2019-10-23 RX ORDER — LABETALOL 200 MG/1
200 TABLET, FILM COATED ORAL 2 TIMES DAILY
Qty: 60 TABLET | Refills: 3 | Status: SHIPPED | OUTPATIENT
Start: 2019-10-23 | End: 2020-02-18

## 2019-10-23 RX ORDER — AMLODIPINE BESYLATE 10 MG/1
10 TABLET ORAL DAILY
Qty: 30 TABLET | Refills: 3 | Status: SHIPPED | OUTPATIENT
Start: 2019-10-23 | End: 2020-02-18

## 2019-12-18 DIAGNOSIS — I10 ESSENTIAL HYPERTENSION: ICD-10-CM

## 2019-12-18 DIAGNOSIS — F41.9 ANXIETY: ICD-10-CM

## 2019-12-18 RX ORDER — BUPROPION HYDROCHLORIDE 75 MG/1
75 TABLET ORAL DAILY
Qty: 30 TABLET | Refills: 1 | Status: SHIPPED | OUTPATIENT
Start: 2019-12-18 | End: 2020-01-24 | Stop reason: DRUGHIGH

## 2019-12-18 RX ORDER — HYDRALAZINE HYDROCHLORIDE 10 MG/1
10 TABLET, FILM COATED ORAL 2 TIMES DAILY
Qty: 60 TABLET | Refills: 1 | Status: SHIPPED | OUTPATIENT
Start: 2019-12-18 | End: 2020-02-18

## 2020-01-14 ENCOUNTER — TELEPHONE (OUTPATIENT)
Dept: FAMILY MEDICINE CLINIC | Facility: CLINIC | Age: 35
End: 2020-01-14

## 2020-01-14 NOTE — TELEPHONE ENCOUNTER
Double book ASAP any day in the eveing Yes.  She can double her Wellbutrin from 75 mg daily to 150 mg daily.  Please give her our condolences on her loss.  Thank you

## 2020-01-14 NOTE — TELEPHONE ENCOUNTER
Please call pt, she is wanting to know if she can take 2 Wellbutrin instead of one for a few days. Her Grandmother passed away and she says she is not doing well at present time. Thanks!

## 2020-01-24 ENCOUNTER — OFFICE VISIT (OUTPATIENT)
Dept: FAMILY MEDICINE CLINIC | Facility: CLINIC | Age: 35
End: 2020-01-24

## 2020-01-24 VITALS
HEIGHT: 62 IN | DIASTOLIC BLOOD PRESSURE: 82 MMHG | SYSTOLIC BLOOD PRESSURE: 142 MMHG | WEIGHT: 293 LBS | BODY MASS INDEX: 53.92 KG/M2

## 2020-01-24 DIAGNOSIS — E66.01 MORBID OBESITY (HCC): ICD-10-CM

## 2020-01-24 DIAGNOSIS — I10 ESSENTIAL HYPERTENSION: Primary | ICD-10-CM

## 2020-01-24 DIAGNOSIS — Z13.220 SCREENING FOR HYPERLIPIDEMIA: ICD-10-CM

## 2020-01-24 DIAGNOSIS — F41.9 ANXIETY: ICD-10-CM

## 2020-01-24 DIAGNOSIS — Z13.29 SCREENING FOR HYPOTHYROIDISM: ICD-10-CM

## 2020-01-24 DIAGNOSIS — D64.9 LOW HEMOGLOBIN AND LOW HEMATOCRIT: ICD-10-CM

## 2020-01-24 PROCEDURE — 99214 OFFICE O/P EST MOD 30 MIN: CPT | Performed by: NURSE PRACTITIONER

## 2020-01-24 RX ORDER — BUSPIRONE HYDROCHLORIDE 10 MG/1
10 TABLET ORAL 3 TIMES DAILY
Qty: 90 TABLET | Refills: 1 | Status: SHIPPED | OUTPATIENT
Start: 2020-01-24 | End: 2020-02-18

## 2020-01-24 RX ORDER — BUPROPION HYDROCHLORIDE 100 MG/1
100 TABLET ORAL DAILY
Qty: 30 TABLET | Refills: 5 | Status: SHIPPED | OUTPATIENT
Start: 2020-01-24 | End: 2020-06-16

## 2020-01-24 NOTE — PROGRESS NOTES
Subjective   Do Miles is a 34 y.o. female.  Six month follow-up for HTN and obesity.  Anxiety and derepression have been worsening over the last few weeks due to the recent death of her grandmother.  Was on Belviq for weight loss but would like to try to go back on Saxenda.    Hypertension   This is a chronic problem. The current episode started more than 1 year ago. The problem is unchanged. The problem is uncontrolled. Associated symptoms include anxiety. Pertinent negatives include no chest pain. There are no associated agents to hypertension. Risk factors for coronary artery disease include family history, obesity, sedentary lifestyle and stress. Current antihypertension treatment includes ACE inhibitors and beta blockers. The current treatment provides moderate improvement. There are no compliance problems.    Obesity   This is a chronic problem. The current episode started more than 1 year ago. The problem occurs constantly. The problem has been gradually worsening. Pertinent negatives include no chest pain, chills, diaphoresis, fatigue or fever. The symptoms are aggravated by drinking and eating. She has tried nothing for the symptoms. The treatment provided no relief.   Anxiety   Presents for follow-up visit. Onset was 1 to 5 years ago. The problem has been rapidly worsening. Symptoms include decreased concentration and depressed mood. Patient reports no chest pain or confusion. Symptoms occur constantly. The severity of symptoms is moderate. The symptoms are aggravated by family issues. The quality of sleep is fair. Nighttime awakenings: occasional.     There are no known risk factors. Her past medical history is significant for anxiety/panic attacks. Past treatments include nothing. Compliance with medications is %.        The following portions of the patient's history were reviewed and updated as appropriate:   Current Outpatient Medications   Medication Sig Dispense Refill   •  "amLODIPine (NORVASC) 10 MG tablet Take 1 tablet by mouth Daily. 30 tablet 3   • cloNIDine (CATAPRES) 0.2 MG tablet Take 1 tablet by mouth every night at bedtime. 30 tablet 3   • ferrous sulfate 325 (65 FE) MG EC tablet Take 1 tablet by mouth Daily With Breakfast. (Patient taking differently: Take 325 mg by mouth 3 (Three) Times a Day With Meals.) 30 tablet 11   • hydrALAZINE (APRESOLINE) 10 MG tablet Take 1 tablet by mouth 2 (Two) Times a Day. 60 tablet 1   • labetalol (NORMODYNE) 200 MG tablet Take 1 tablet by mouth 2 (Two) Times a Day. 60 tablet 3   • levonorgestrel (MIRENA) 20 MCG/24HR IUD 1 each by Intrauterine route 1 (One) Time.     • buPROPion (WELLBUTRIN) 100 MG tablet Take 1 tablet by mouth Daily. 30 tablet 5   • busPIRone (BUSPAR) 10 MG tablet Take 1 tablet by mouth 3 (Three) Times a Day. 90 tablet 1   • Insulin Pen Needle (PEN NEEDLES 5/16\") 31G X 8 MM misc Use with Saxenda daily. 100 each 1     No current facility-administered medications for this visit.      Current Outpatient Medications on File Prior to Visit   Medication Sig   • amLODIPine (NORVASC) 10 MG tablet Take 1 tablet by mouth Daily.   • cloNIDine (CATAPRES) 0.2 MG tablet Take 1 tablet by mouth every night at bedtime.   • ferrous sulfate 325 (65 FE) MG EC tablet Take 1 tablet by mouth Daily With Breakfast. (Patient taking differently: Take 325 mg by mouth 3 (Three) Times a Day With Meals.)   • hydrALAZINE (APRESOLINE) 10 MG tablet Take 1 tablet by mouth 2 (Two) Times a Day.   • labetalol (NORMODYNE) 200 MG tablet Take 1 tablet by mouth 2 (Two) Times a Day.   • levonorgestrel (MIRENA) 20 MCG/24HR IUD 1 each by Intrauterine route 1 (One) Time.   • [DISCONTINUED] buPROPion (WELLBUTRIN) 75 MG tablet Take 1 tablet by mouth Daily.   • Insulin Pen Needle (PEN NEEDLES 5/16\") 31G X 8 MM misc Use with Saxenda daily.   • [DISCONTINUED] Liraglutide -Weight Management 18 MG/3ML solution pen-injector Inject 0.6 mg under the skin daily x 1 week. Increase " "dose by 0.6 mg on a weekly basis as tolerated until max dose of 3 mg daily is reached.   • [DISCONTINUED] Lorcaserin HCl ER 20 MG tablet sustained-release 24 hour Take 1 tablet by mouth Daily.     No current facility-administered medications on file prior to visit.      She is allergic to latex and adhesive tape..    Review of Systems   Constitutional: Negative.  Negative for chills, diaphoresis, fatigue and fever.   Respiratory: Negative.    Cardiovascular: Negative.  Negative for chest pain.   Gastrointestinal: Negative.    Genitourinary: Negative.    Musculoskeletal: Negative.    Skin: Negative.    Neurological: Negative.    Psychiatric/Behavioral: Positive for decreased concentration. Negative for confusion.       Objective    Visit Vitals  /82   Ht 157.5 cm (62\")   Wt 135 kg (297 lb 4.8 oz)   LMP 01/13/2020 (Exact Date)   BMI 54.38 kg/m²       Physical Exam   Constitutional: She is oriented to person, place, and time. She appears well-developed and well-nourished.   HENT:   Head: Normocephalic.   Right Ear: External ear normal.   Left Ear: External ear normal.   Eyes: Pupils are equal, round, and reactive to light. EOM are normal.   Neck: Normal range of motion. Neck supple.   Cardiovascular: Normal rate, regular rhythm and normal heart sounds.   Pulmonary/Chest: Effort normal and breath sounds normal.   Abdominal: Soft. Bowel sounds are normal.   Musculoskeletal: Normal range of motion.   Neurological: She is alert and oriented to person, place, and time.   Skin: Skin is warm. Capillary refill takes less than 2 seconds.   Psychiatric: Her speech is normal and behavior is normal. She exhibits a depressed mood. She expresses no homicidal and no suicidal ideation.   Nursing note and vitals reviewed.      Assessment/Plan   Problems Addressed this Visit        Cardiovascular and Mediastinum    Hypertension - Primary    Relevant Orders    CBC & Differential    Comprehensive Metabolic Panel       Other    " Anxiety    Relevant Medications    buPROPion (WELLBUTRIN) 100 MG tablet    busPIRone (BUSPAR) 10 MG tablet      Other Visit Diagnoses     Morbid obesity (CMS/HCC)        Low hemoglobin and low hematocrit        Relevant Orders    Iron and TIBC    Ferritin    Screening for hypothyroidism        Relevant Orders    TSH    Screening for hyperlipidemia        Relevant Orders    Lipid panel        New Medications Ordered This Visit   Medications   • buPROPion (WELLBUTRIN) 100 MG tablet     Sig: Take 1 tablet by mouth Daily.     Dispense:  30 tablet     Refill:  5   • busPIRone (BUSPAR) 10 MG tablet     Sig: Take 1 tablet by mouth 3 (Three) Times a Day.     Dispense:  90 tablet     Refill:  1       1.  Essential hypertension:  Please CBC and chemistry panel as ordered will notify results when available  Continue on Norvasc, Catapres, Apresoline and Normodyne as previously prescribed  Encouraged to reduce sodium intake to no more than 2000 mg/day  Strongly encouraged reading sodium labels for better adherence to low-sodium diet    2.  Anxiety:  Increase Wellbutrin from 75 mg p.o. daily to 100 mg p.o. daily  Begin BuSpar as prescribed  Educated on possible side of this medication including but not limited possible suicidal ideations  Encouraged to discontinue medication immediately if suicidal ideations occur and seek emergency medical treatment  Discussed how anxiety and depression can be exacerbated by loss and grief    3.  Morbid Obesity:  Discontinue Belviq as previously prescribed  Begin Saxenda as prescribed  Educated on possible side effects of this medication including but not limited to increased risk for gastrointestinal discomfort, medullary thyroid cancer and pancreatitis  Encouraged to reduce daily caloric intake to no more than 2000 mg/day  Begin physical activity regimen of a minimum of 150 minutes/week  Encourage use of food journal for self accountability  Discussed healthy food options such as baked chicken  and fish, fresh fruits and vegetables and salads    4.  Low hemoglobin and hematocrit:  Complete iron, TIBC and ferritin as ordered and will notify results when available  Encouraged to increase iron rich foods in diet    5.  Screening for hypothyroidism:  Complete TSH as ordered and will notify results when available    6.  Screening for hypercholesterolemia:  Complete fasting lipid panel as ordered will notify results when available  Encouraged adhere to low-fat diet    Continue on current medications as previously prescribed   I spent minutes in direct face to face contact with patient.  Greater than 50% of this time was spent counseling patient and discussing plan of care.  Return in about 6 months (around 7/24/2020) for Annual physical.        This document has been electronically signed by JEWELS Sierra on January 24, 2020 8:23 AM

## 2020-02-18 DIAGNOSIS — F41.9 ANXIETY: ICD-10-CM

## 2020-02-18 DIAGNOSIS — I10 ESSENTIAL HYPERTENSION: ICD-10-CM

## 2020-02-18 RX ORDER — AMLODIPINE BESYLATE 10 MG/1
10 TABLET ORAL DAILY
Qty: 30 TABLET | Refills: 3 | Status: SHIPPED | OUTPATIENT
Start: 2020-02-18 | End: 2020-06-16

## 2020-02-18 RX ORDER — BUSPIRONE HYDROCHLORIDE 10 MG/1
10 TABLET ORAL 3 TIMES DAILY
Qty: 90 TABLET | Refills: 1 | Status: SHIPPED | OUTPATIENT
Start: 2020-02-18 | End: 2020-04-13

## 2020-02-18 RX ORDER — LABETALOL 200 MG/1
200 TABLET, FILM COATED ORAL 2 TIMES DAILY
Qty: 60 TABLET | Refills: 3 | Status: SHIPPED | OUTPATIENT
Start: 2020-02-18 | End: 2020-06-16

## 2020-02-18 RX ORDER — HYDRALAZINE HYDROCHLORIDE 10 MG/1
10 TABLET, FILM COATED ORAL 2 TIMES DAILY
Qty: 60 TABLET | Refills: 1 | Status: SHIPPED | OUTPATIENT
Start: 2020-02-18 | End: 2020-04-13

## 2020-02-18 RX ORDER — CLONIDINE HYDROCHLORIDE 0.2 MG/1
0.2 TABLET ORAL
Qty: 30 TABLET | Refills: 3 | Status: SHIPPED | OUTPATIENT
Start: 2020-02-18 | End: 2020-06-16

## 2020-03-24 ENCOUNTER — TELEPHONE (OUTPATIENT)
Dept: FAMILY MEDICINE CLINIC | Facility: CLINIC | Age: 35
End: 2020-03-24

## 2020-03-24 DIAGNOSIS — N30.00 ACUTE CYSTITIS WITHOUT HEMATURIA: Primary | ICD-10-CM

## 2020-03-24 RX ORDER — NITROFURANTOIN 25; 75 MG/1; MG/1
100 CAPSULE ORAL 2 TIMES DAILY
Qty: 10 CAPSULE | Refills: 0 | Status: SHIPPED | OUTPATIENT
Start: 2020-03-24 | End: 2022-04-05

## 2020-03-24 NOTE — TELEPHONE ENCOUNTER
Patient described symptoms of UTI. She endorsed frequency, urgency, and suprapubic pressure. She denies fevers or chills. She has not had UTI symptoms since prior to her last pregnancy. Symptoms have been ongoing x 1 day. UA in office showed 2+ leuk esterase. Will call in macrobid.        Sheila Singleton MD PGY3  [facility]  This document has been electronically signed by Sheila Singleton MD on March 24, 2020 10:48

## 2020-03-30 ENCOUNTER — LAB (OUTPATIENT)
Dept: LAB | Facility: HOSPITAL | Age: 35
End: 2020-03-30

## 2020-03-30 DIAGNOSIS — Z13.29 SCREENING FOR HYPOTHYROIDISM: ICD-10-CM

## 2020-03-30 DIAGNOSIS — I10 ESSENTIAL HYPERTENSION: ICD-10-CM

## 2020-03-30 DIAGNOSIS — Z13.220 SCREENING FOR HYPERLIPIDEMIA: ICD-10-CM

## 2020-03-30 DIAGNOSIS — D64.9 LOW HEMOGLOBIN AND LOW HEMATOCRIT: ICD-10-CM

## 2020-03-30 LAB
ALBUMIN SERPL-MCNC: 4.3 G/DL (ref 3.5–5.2)
ALBUMIN/GLOB SERPL: 1.5 G/DL
ALP SERPL-CCNC: 98 U/L (ref 39–117)
ALT SERPL W P-5'-P-CCNC: 17 U/L (ref 1–33)
ANION GAP SERPL CALCULATED.3IONS-SCNC: 12.1 MMOL/L (ref 5–15)
AST SERPL-CCNC: 16 U/L (ref 1–32)
BILIRUB SERPL-MCNC: 0.2 MG/DL (ref 0.2–1.2)
BUN BLD-MCNC: 11 MG/DL (ref 6–20)
BUN/CREAT SERPL: 14.9 (ref 7–25)
CALCIUM SPEC-SCNC: 10 MG/DL (ref 8.6–10.5)
CHLORIDE SERPL-SCNC: 100 MMOL/L (ref 98–107)
CHOLEST SERPL-MCNC: 160 MG/DL (ref 0–200)
CO2 SERPL-SCNC: 24.9 MMOL/L (ref 22–29)
CREAT BLD-MCNC: 0.74 MG/DL (ref 0.57–1)
FERRITIN SERPL-MCNC: 10.3 NG/ML (ref 13–150)
GFR SERPL CREATININE-BSD FRML MDRD: 90 ML/MIN/1.73
GLOBULIN UR ELPH-MCNC: 2.9 GM/DL
GLUCOSE BLD-MCNC: 97 MG/DL (ref 65–99)
HDLC SERPL-MCNC: 50 MG/DL (ref 40–60)
IRON 24H UR-MRATE: 19 MCG/DL (ref 37–145)
IRON SATN MFR SERPL: 5 % (ref 20–50)
LDLC SERPL CALC-MCNC: 100 MG/DL (ref 0–100)
LDLC/HDLC SERPL: 1.99 {RATIO}
POTASSIUM BLD-SCNC: 4.3 MMOL/L (ref 3.5–5.2)
PROT SERPL-MCNC: 7.2 G/DL (ref 6–8.5)
SODIUM BLD-SCNC: 137 MMOL/L (ref 136–145)
TIBC SERPL-MCNC: 408 MCG/DL (ref 298–536)
TRANSFERRIN SERPL-MCNC: 274 MG/DL (ref 200–360)
TRIGL SERPL-MCNC: 52 MG/DL (ref 0–150)
TSH SERPL DL<=0.05 MIU/L-ACNC: 2.6 UIU/ML (ref 0.27–4.2)
VLDLC SERPL-MCNC: 10.4 MG/DL (ref 5–40)

## 2020-03-30 PROCEDURE — 84466 ASSAY OF TRANSFERRIN: CPT

## 2020-03-30 PROCEDURE — 83540 ASSAY OF IRON: CPT

## 2020-03-30 PROCEDURE — 80061 LIPID PANEL: CPT

## 2020-03-30 PROCEDURE — 82728 ASSAY OF FERRITIN: CPT

## 2020-03-30 PROCEDURE — 84443 ASSAY THYROID STIM HORMONE: CPT

## 2020-03-30 PROCEDURE — 80053 COMPREHEN METABOLIC PANEL: CPT

## 2020-03-31 DIAGNOSIS — D64.9 LOW HEMOGLOBIN AND LOW HEMATOCRIT: ICD-10-CM

## 2020-03-31 RX ORDER — LANOLIN ALCOHOL/MO/W.PET/CERES
325 CREAM (GRAM) TOPICAL
Qty: 90 TABLET | Refills: 3 | Status: SHIPPED | OUTPATIENT
Start: 2020-03-31 | End: 2023-03-21 | Stop reason: SDUPTHER

## 2020-04-13 DIAGNOSIS — F41.9 ANXIETY: ICD-10-CM

## 2020-04-13 DIAGNOSIS — I10 ESSENTIAL HYPERTENSION: ICD-10-CM

## 2020-04-13 RX ORDER — BUSPIRONE HYDROCHLORIDE 10 MG/1
10 TABLET ORAL 3 TIMES DAILY
Qty: 90 TABLET | Refills: 1 | Status: SHIPPED | OUTPATIENT
Start: 2020-04-13 | End: 2020-06-16

## 2020-04-13 RX ORDER — HYDRALAZINE HYDROCHLORIDE 10 MG/1
10 TABLET, FILM COATED ORAL 2 TIMES DAILY
Qty: 60 TABLET | Refills: 1 | Status: SHIPPED | OUTPATIENT
Start: 2020-04-13 | End: 2020-06-16

## 2020-06-16 DIAGNOSIS — I10 ESSENTIAL HYPERTENSION: ICD-10-CM

## 2020-06-16 DIAGNOSIS — F41.9 ANXIETY: ICD-10-CM

## 2020-06-16 RX ORDER — BUSPIRONE HYDROCHLORIDE 10 MG/1
10 TABLET ORAL 3 TIMES DAILY
Qty: 90 TABLET | Refills: 1 | Status: SHIPPED | OUTPATIENT
Start: 2020-06-16 | End: 2020-08-14

## 2020-06-16 RX ORDER — CLONIDINE HYDROCHLORIDE 0.2 MG/1
0.2 TABLET ORAL
Qty: 30 TABLET | Refills: 3 | Status: SHIPPED | OUTPATIENT
Start: 2020-06-16 | End: 2020-10-12 | Stop reason: SDUPTHER

## 2020-06-16 RX ORDER — HYDRALAZINE HYDROCHLORIDE 10 MG/1
10 TABLET, FILM COATED ORAL 2 TIMES DAILY
Qty: 60 TABLET | Refills: 1 | Status: SHIPPED | OUTPATIENT
Start: 2020-06-16 | End: 2020-08-14

## 2020-06-16 RX ORDER — BUPROPION HYDROCHLORIDE 100 MG/1
100 TABLET ORAL DAILY
Qty: 30 TABLET | Refills: 5 | Status: SHIPPED | OUTPATIENT
Start: 2020-06-16 | End: 2020-12-11 | Stop reason: SDUPTHER

## 2020-06-16 RX ORDER — LABETALOL 200 MG/1
200 TABLET, FILM COATED ORAL 2 TIMES DAILY
Qty: 60 TABLET | Refills: 3 | Status: SHIPPED | OUTPATIENT
Start: 2020-06-16 | End: 2020-10-12 | Stop reason: SDUPTHER

## 2020-06-16 RX ORDER — AMLODIPINE BESYLATE 10 MG/1
10 TABLET ORAL DAILY
Qty: 30 TABLET | Refills: 3 | Status: SHIPPED | OUTPATIENT
Start: 2020-06-16 | End: 2020-10-12 | Stop reason: SDUPTHER

## 2020-08-14 DIAGNOSIS — F41.9 ANXIETY: ICD-10-CM

## 2020-08-14 DIAGNOSIS — I10 ESSENTIAL HYPERTENSION: ICD-10-CM

## 2020-08-14 RX ORDER — BUSPIRONE HYDROCHLORIDE 10 MG/1
10 TABLET ORAL 3 TIMES DAILY
Qty: 90 TABLET | Refills: 1 | Status: SHIPPED | OUTPATIENT
Start: 2020-08-14 | End: 2020-10-12 | Stop reason: SDUPTHER

## 2020-08-14 RX ORDER — HYDRALAZINE HYDROCHLORIDE 10 MG/1
10 TABLET, FILM COATED ORAL 2 TIMES DAILY
Qty: 60 TABLET | Refills: 1 | Status: SHIPPED | OUTPATIENT
Start: 2020-08-14 | End: 2020-10-12 | Stop reason: SDUPTHER

## 2020-09-18 ENCOUNTER — TELEPHONE (OUTPATIENT)
Dept: FAMILY MEDICINE CLINIC | Facility: CLINIC | Age: 35
End: 2020-09-18

## 2020-10-12 DIAGNOSIS — I10 ESSENTIAL HYPERTENSION: ICD-10-CM

## 2020-10-12 DIAGNOSIS — F41.9 ANXIETY: ICD-10-CM

## 2020-10-12 RX ORDER — BUSPIRONE HYDROCHLORIDE 10 MG/1
10 TABLET ORAL 3 TIMES DAILY
Qty: 90 TABLET | Refills: 1 | Status: SHIPPED | OUTPATIENT
Start: 2020-10-12 | End: 2021-01-15 | Stop reason: SDUPTHER

## 2020-10-12 RX ORDER — LABETALOL 200 MG/1
200 TABLET, FILM COATED ORAL 2 TIMES DAILY
Qty: 60 TABLET | Refills: 3 | Status: SHIPPED | OUTPATIENT
Start: 2020-10-12 | End: 2021-02-15 | Stop reason: SDUPTHER

## 2020-10-12 RX ORDER — HYDRALAZINE HYDROCHLORIDE 10 MG/1
10 TABLET, FILM COATED ORAL 2 TIMES DAILY
Qty: 60 TABLET | Refills: 1 | Status: SHIPPED | OUTPATIENT
Start: 2020-10-12 | End: 2020-12-11 | Stop reason: SDUPTHER

## 2020-10-12 RX ORDER — CLONIDINE HYDROCHLORIDE 0.2 MG/1
0.2 TABLET ORAL
Qty: 30 TABLET | Refills: 3 | Status: SHIPPED | OUTPATIENT
Start: 2020-10-12 | End: 2021-02-15 | Stop reason: SDUPTHER

## 2020-10-12 RX ORDER — AMLODIPINE BESYLATE 10 MG/1
10 TABLET ORAL DAILY
Qty: 30 TABLET | Refills: 3 | Status: SHIPPED | OUTPATIENT
Start: 2020-10-12 | End: 2021-02-15 | Stop reason: SDUPTHER

## 2020-10-12 NOTE — TELEPHONE ENCOUNTER
"Mei    Ms. Valerie Miles is requesting a refill several of her meds.     Last OV 01/24/2020, was ask to RTC in 6 months.    PC appointments on\"    02/18/2020 07/24/2020 08/03/2020 08/05/2020    Please Advise on refill request  "

## 2020-11-12 ENCOUNTER — TELEMEDICINE (OUTPATIENT)
Dept: FAMILY MEDICINE CLINIC | Facility: CLINIC | Age: 35
End: 2020-11-12

## 2020-11-12 DIAGNOSIS — Z13.220 SCREENING FOR HYPERLIPIDEMIA: ICD-10-CM

## 2020-11-12 DIAGNOSIS — Z13.29 SCREENING FOR HYPOTHYROIDISM: ICD-10-CM

## 2020-11-12 DIAGNOSIS — I10 ESSENTIAL HYPERTENSION: Primary | ICD-10-CM

## 2020-11-12 DIAGNOSIS — F41.9 ANXIETY: ICD-10-CM

## 2020-11-12 DIAGNOSIS — Z12.4 SCREENING FOR CERVICAL CANCER: ICD-10-CM

## 2020-11-12 DIAGNOSIS — E66.01 MORBID OBESITY (HCC): ICD-10-CM

## 2020-11-12 PROCEDURE — 99213 OFFICE O/P EST LOW 20 MIN: CPT | Performed by: NURSE PRACTITIONER

## 2020-11-12 NOTE — PROGRESS NOTES
"Subjective   Do Miles is a 35 y.o. female.  6-month follow-up for high blood pressure, anxiety and obesity.  Recently started a new job at the same company.  \"It has only been my first week but it has been really good.\"    Hypertension  This is a chronic problem. The current episode started more than 1 year ago. The problem is unchanged. The problem is uncontrolled. Associated symptoms include anxiety. Pertinent negatives include no chest pain. There are no associated agents to hypertension. Risk factors for coronary artery disease include family history, obesity, sedentary lifestyle and stress. Current antihypertension treatment includes ACE inhibitors and beta blockers. The current treatment provides moderate improvement. There are no compliance problems.    Obesity  This is a chronic problem. The current episode started more than 1 year ago. The problem occurs constantly. The problem has been gradually worsening. Pertinent negatives include no chest pain, chills, diaphoresis, fatigue or fever. The symptoms are aggravated by drinking and eating. She has tried nothing for the symptoms. The treatment provided no relief.   Anxiety  Presents for follow-up visit. Onset was 1 to 5 years ago. The problem has been rapidly worsening. Symptoms include depressed mood. Patient reports no chest pain or confusion. Symptoms occur constantly. The severity of symptoms is moderate. The symptoms are aggravated by family issues. The quality of sleep is fair. Nighttime awakenings: occasional.     There are no known risk factors. Her past medical history is significant for anxiety/panic attacks. Past treatments include nothing. Compliance with medications is %.        The following portions of the patient's history were reviewed and updated as appropriate:     Current Outpatient Medications   Medication Sig Dispense Refill   • amLODIPine (Norvasc) 10 MG tablet Take 1 tablet by mouth Daily. 30 tablet 3   • buPROPion " "(WELLBUTRIN) 100 MG tablet Take 1 tablet by mouth Daily. 30 tablet 5   • busPIRone (BUSPAR) 10 MG tablet Take 1 tablet by mouth 3 (Three) Times a Day. 90 tablet 1   • cloNIDine (CATAPRES) 0.2 MG tablet Take 1 tablet by mouth every night at bedtime. 30 tablet 3   • ferrous sulfate 325 (65 FE) MG EC tablet Take 1 tablet by mouth 3 (Three) Times a Day With Meals. 90 tablet 3   • hydrALAZINE (APRESOLINE) 10 MG tablet Take 1 tablet by mouth 2 (Two) Times a Day. 60 tablet 1   • Insulin Pen Needle (PEN NEEDLES 5/16\") 31G X 8 MM misc Use with Saxenda daily. 100 each 1   • labetalol (NORMODYNE) 200 MG tablet Take 1 tablet by mouth 2 (Two) Times a Day. 60 tablet 3   • levonorgestrel (MIRENA) 20 MCG/24HR IUD 1 each by Intrauterine route 1 (One) Time.     • nitrofurantoin, macrocrystal-monohydrate, (Macrobid) 100 MG capsule Take 1 capsule by mouth 2 (Two) Times a Day. 10 capsule 0     No current facility-administered medications for this visit.      Current Outpatient Medications on File Prior to Visit   Medication Sig   • amLODIPine (Norvasc) 10 MG tablet Take 1 tablet by mouth Daily.   • buPROPion (WELLBUTRIN) 100 MG tablet Take 1 tablet by mouth Daily.   • busPIRone (BUSPAR) 10 MG tablet Take 1 tablet by mouth 3 (Three) Times a Day.   • cloNIDine (CATAPRES) 0.2 MG tablet Take 1 tablet by mouth every night at bedtime.   • ferrous sulfate 325 (65 FE) MG EC tablet Take 1 tablet by mouth 3 (Three) Times a Day With Meals.   • hydrALAZINE (APRESOLINE) 10 MG tablet Take 1 tablet by mouth 2 (Two) Times a Day.   • Insulin Pen Needle (PEN NEEDLES 5/16\") 31G X 8 MM misc Use with Saxenda daily.   • labetalol (NORMODYNE) 200 MG tablet Take 1 tablet by mouth 2 (Two) Times a Day.   • levonorgestrel (MIRENA) 20 MCG/24HR IUD 1 each by Intrauterine route 1 (One) Time.   • nitrofurantoin, macrocrystal-monohydrate, (Macrobid) 100 MG capsule Take 1 capsule by mouth 2 (Two) Times a Day.     No current facility-administered medications on file " prior to visit.      She is allergic to latex and adhesive tape..    Review of Systems   Constitutional: Negative.  Negative for chills, diaphoresis, fatigue and fever.   HENT: Negative.    Eyes: Negative.    Respiratory: Negative.    Cardiovascular: Negative.  Negative for chest pain.   Gastrointestinal: Negative.    Genitourinary: Negative.    Musculoskeletal: Negative.    Skin: Negative.    Neurological: Negative.    Psychiatric/Behavioral: Negative.  Negative for confusion.       Objective    There were no vitals taken for this visit.    Physical Exam  Constitutional:       Appearance: She is obese.      Comments: Patient directed physical exam    Neck:      Musculoskeletal: Normal range of motion.   Pulmonary:      Effort: Pulmonary effort is normal.   Abdominal:      Palpations: Abdomen is soft.   Musculoskeletal: Normal range of motion.   Skin:     General: Skin is warm.   Neurological:      Mental Status: She is alert and oriented to person, place, and time.         Assessment/Plan   Problems Addressed this Visit        Cardiovascular and Mediastinum    Hypertension - Primary       Other    Anxiety      Other Visit Diagnoses     Morbid obesity (CMS/HCC)        Relevant Orders    CBC & Differential    Comprehensive Metabolic Panel    Screening for hypothyroidism        Relevant Orders    TSH    Screening for hyperlipidemia        Relevant Orders    Lipid Panel    Screening for cervical cancer        Relevant Orders    Ambulatory Referral to Gynecology      Diagnoses       Codes Comments    Essential hypertension    -  Primary ICD-10-CM: I10  ICD-9-CM: 401.9     Anxiety     ICD-10-CM: F41.9  ICD-9-CM: 300.00     Morbid obesity (CMS/HCC)     ICD-10-CM: E66.01  ICD-9-CM: 278.01     Screening for hypothyroidism     ICD-10-CM: Z13.29  ICD-9-CM: V77.0     Screening for hyperlipidemia     ICD-10-CM: Z13.220  ICD-9-CM: V77.91     Screening for cervical cancer     ICD-10-CM: Z12.4  ICD-9-CM: V76.2       No orders of  the defined types were placed in this encounter.    1.  Essential hypertension:  Continue on Norvasc, Catapres, Apresoline and Normodyne as previously prescribed  Encouraged to reduce sodium intake to no more than 2000 mg/day  Discussed avoiding high sodium content snacks such as potato chips, microwavable meals and processed meats     2.  Anxiety:  Continue on Wellbutrin and BuSpar as previously prescribed  Educated on possible side of this medication including but not limited possible suicidal ideations  Discussed stress relieving techniques such as deep breathing, meditation  Encouraged to discontinue medication immediately if suicidal ideations occur and seek emergency medical treatment     3.  Morbid Obesity:  Complete CBC and chemistry panel as ordered and will notify of results when available  Discontinue Belviq as previously prescribed  Encouraged to reduce daily caloric intake to no more than 2000 mg/day  Begin physical activity regimen of a minimum of 150 minutes/week by exercises such as walking, bicycling or hiking  Encouraged to join self support group such as [employer] or [facility]    4.  Screening for hypothyroidism:  Complete TSH as ordered and will notify results when available    5.  Screening for hyperlipidemia:  Complete fasting lipid panel as ordered and will notify of results when available  Encouraged adhere to a low-fat diet  Discussed ways to reduce saturated fats in diet such as choosing lean meat like chicken and fish as opposed to fatty red meat    6.  Screening for cervical cancer:  Referral placed to Dr. Ocampo for annual GYN exam with removal of IUD    You have chosen to receive care through a telehealth visit.  Do you consent to use a video/audio connection for your medical care today? Yes  Continue on current medications as previously prescribed   This was an audio and video enabled telemedicine encounter.  I spent 20 minutes charting and in video face to face contact with  patient.  Greater than 50% of this time was spent counseling patient and discussing plan of care.  Return in about 3 months (around 2/12/2021) for Annual physical.        This document has been electronically signed by JEWELS Sierra on November 12, 2020 14:55 CST

## 2020-12-11 DIAGNOSIS — I10 ESSENTIAL HYPERTENSION: ICD-10-CM

## 2020-12-11 DIAGNOSIS — F41.9 ANXIETY: ICD-10-CM

## 2020-12-11 RX ORDER — BUPROPION HYDROCHLORIDE 100 MG/1
100 TABLET ORAL DAILY
Qty: 30 TABLET | Refills: 5 | Status: SHIPPED | OUTPATIENT
Start: 2020-12-11 | End: 2021-08-03 | Stop reason: SDUPTHER

## 2020-12-11 RX ORDER — HYDRALAZINE HYDROCHLORIDE 10 MG/1
10 TABLET, FILM COATED ORAL 2 TIMES DAILY
Qty: 60 TABLET | Refills: 1 | Status: SHIPPED | OUTPATIENT
Start: 2020-12-11 | End: 2021-02-15 | Stop reason: SDUPTHER

## 2020-12-22 ENCOUNTER — IMMUNIZATION (OUTPATIENT)
Dept: VACCINE CLINIC | Facility: HOSPITAL | Age: 35
End: 2020-12-22

## 2020-12-22 PROCEDURE — 91300 HC SARSCOV02 VAC 30MCG/0.3ML IM: CPT | Performed by: THORACIC SURGERY (CARDIOTHORACIC VASCULAR SURGERY)

## 2020-12-22 PROCEDURE — 0001A: CPT | Performed by: THORACIC SURGERY (CARDIOTHORACIC VASCULAR SURGERY)

## 2020-12-31 ENCOUNTER — TRANSCRIBE ORDERS (OUTPATIENT)
Dept: CARDIOLOGY | Facility: CLINIC | Age: 35
End: 2020-12-31

## 2021-01-12 ENCOUNTER — IMMUNIZATION (OUTPATIENT)
Dept: VACCINE CLINIC | Facility: HOSPITAL | Age: 36
End: 2021-01-12

## 2021-01-12 PROCEDURE — 0001A: CPT | Performed by: THORACIC SURGERY (CARDIOTHORACIC VASCULAR SURGERY)

## 2021-01-12 PROCEDURE — 91300 HC SARSCOV02 VAC 30MCG/0.3ML IM: CPT | Performed by: THORACIC SURGERY (CARDIOTHORACIC VASCULAR SURGERY)

## 2021-01-12 PROCEDURE — 0002A: CPT | Performed by: THORACIC SURGERY (CARDIOTHORACIC VASCULAR SURGERY)

## 2021-01-15 DIAGNOSIS — F41.9 ANXIETY: ICD-10-CM

## 2021-01-15 RX ORDER — BUSPIRONE HYDROCHLORIDE 10 MG/1
10 TABLET ORAL 3 TIMES DAILY
Qty: 90 TABLET | Refills: 1 | Status: SHIPPED | OUTPATIENT
Start: 2021-01-15 | End: 2021-03-12 | Stop reason: SDUPTHER

## 2021-02-15 DIAGNOSIS — I10 ESSENTIAL HYPERTENSION: ICD-10-CM

## 2021-02-15 RX ORDER — LABETALOL 200 MG/1
200 TABLET, FILM COATED ORAL 2 TIMES DAILY
Qty: 60 TABLET | Refills: 3 | Status: SHIPPED | OUTPATIENT
Start: 2021-02-15 | End: 2021-08-09 | Stop reason: SDUPTHER

## 2021-02-15 RX ORDER — AMLODIPINE BESYLATE 10 MG/1
10 TABLET ORAL DAILY
Qty: 30 TABLET | Refills: 3 | Status: SHIPPED | OUTPATIENT
Start: 2021-02-15 | End: 2021-08-03 | Stop reason: SDUPTHER

## 2021-02-15 RX ORDER — CLONIDINE HYDROCHLORIDE 0.2 MG/1
0.2 TABLET ORAL
Qty: 30 TABLET | Refills: 3 | Status: SHIPPED | OUTPATIENT
Start: 2021-02-15 | End: 2021-08-09 | Stop reason: SDUPTHER

## 2021-02-15 RX ORDER — HYDRALAZINE HYDROCHLORIDE 10 MG/1
10 TABLET, FILM COATED ORAL 2 TIMES DAILY
Qty: 60 TABLET | Refills: 1 | Status: SHIPPED | OUTPATIENT
Start: 2021-02-15 | End: 2021-05-19 | Stop reason: SDUPTHER

## 2021-03-04 ENCOUNTER — LAB (OUTPATIENT)
Dept: LAB | Facility: HOSPITAL | Age: 36
End: 2021-03-04

## 2021-03-04 ENCOUNTER — OFFICE VISIT (OUTPATIENT)
Dept: FAMILY MEDICINE CLINIC | Facility: CLINIC | Age: 36
End: 2021-03-04

## 2021-03-04 ENCOUNTER — HOSPITAL ENCOUNTER (OUTPATIENT)
Dept: GENERAL RADIOLOGY | Facility: HOSPITAL | Age: 36
Discharge: HOME | End: 2021-03-04

## 2021-03-04 ENCOUNTER — OFFICE VISIT (OUTPATIENT)
Dept: OBSTETRICS AND GYNECOLOGY | Facility: CLINIC | Age: 36
End: 2021-03-04

## 2021-03-04 VITALS
WEIGHT: 293 LBS | BODY MASS INDEX: 53.92 KG/M2 | SYSTOLIC BLOOD PRESSURE: 134 MMHG | HEIGHT: 62 IN | DIASTOLIC BLOOD PRESSURE: 84 MMHG

## 2021-03-04 VITALS
DIASTOLIC BLOOD PRESSURE: 88 MMHG | WEIGHT: 293 LBS | BODY MASS INDEX: 53.92 KG/M2 | HEIGHT: 62 IN | SYSTOLIC BLOOD PRESSURE: 160 MMHG

## 2021-03-04 DIAGNOSIS — T83.32XA MALPOSITIONED IUD, INITIAL ENCOUNTER: ICD-10-CM

## 2021-03-04 DIAGNOSIS — Z13.220 SCREENING FOR HYPERLIPIDEMIA: ICD-10-CM

## 2021-03-04 DIAGNOSIS — Z01.419 WOMEN'S ANNUAL ROUTINE GYNECOLOGICAL EXAMINATION: Primary | ICD-10-CM

## 2021-03-04 DIAGNOSIS — F41.9 ANXIETY: ICD-10-CM

## 2021-03-04 DIAGNOSIS — Z00.00 ANNUAL PHYSICAL EXAM: Primary | ICD-10-CM

## 2021-03-04 DIAGNOSIS — I10 ESSENTIAL HYPERTENSION: ICD-10-CM

## 2021-03-04 DIAGNOSIS — E66.01 MORBID (SEVERE) OBESITY DUE TO EXCESS CALORIES (HCC): ICD-10-CM

## 2021-03-04 DIAGNOSIS — N92.1 MENORRHAGIA WITH IRREGULAR CYCLE: Primary | ICD-10-CM

## 2021-03-04 DIAGNOSIS — T83.32XA INTRAUTERINE CONTRACEPTIVE DEVICE THREADS LOST, INITIAL ENCOUNTER: ICD-10-CM

## 2021-03-04 DIAGNOSIS — Z13.29 SCREENING FOR HYPOTHYROIDISM: ICD-10-CM

## 2021-03-04 LAB
ALBUMIN SERPL-MCNC: 4 G/DL (ref 3.5–5.2)
ALBUMIN/GLOB SERPL: 1.3 G/DL
ALP SERPL-CCNC: 89 U/L (ref 39–117)
ALT SERPL W P-5'-P-CCNC: 17 U/L (ref 1–33)
ANION GAP SERPL CALCULATED.3IONS-SCNC: 9.5 MMOL/L (ref 5–15)
AST SERPL-CCNC: 21 U/L (ref 1–32)
BILIRUB SERPL-MCNC: 0.2 MG/DL (ref 0–1.2)
BUN SERPL-MCNC: 13 MG/DL (ref 6–20)
BUN/CREAT SERPL: 14.6 (ref 7–25)
CALCIUM SPEC-SCNC: 10.6 MG/DL (ref 8.6–10.5)
CHLORIDE SERPL-SCNC: 103 MMOL/L (ref 98–107)
CHOLEST SERPL-MCNC: 153 MG/DL (ref 0–200)
CO2 SERPL-SCNC: 26.5 MMOL/L (ref 22–29)
CREAT SERPL-MCNC: 0.89 MG/DL (ref 0.57–1)
GFR SERPL CREATININE-BSD FRML MDRD: 72 ML/MIN/1.73
GLOBULIN UR ELPH-MCNC: 3.2 GM/DL
GLUCOSE SERPL-MCNC: 92 MG/DL (ref 65–99)
HDLC SERPL-MCNC: 51 MG/DL (ref 40–60)
LDLC SERPL CALC-MCNC: 90 MG/DL (ref 0–100)
LDLC/HDLC SERPL: 1.78 {RATIO}
POTASSIUM SERPL-SCNC: 4.1 MMOL/L (ref 3.5–5.2)
PROT SERPL-MCNC: 7.2 G/DL (ref 6–8.5)
SODIUM SERPL-SCNC: 139 MMOL/L (ref 136–145)
TRIGL SERPL-MCNC: 57 MG/DL (ref 0–150)
TSH SERPL DL<=0.05 MIU/L-ACNC: 1.51 UIU/ML (ref 0.27–4.2)
VLDLC SERPL-MCNC: 12 MG/DL (ref 5–40)

## 2021-03-04 PROCEDURE — 99385 PREV VISIT NEW AGE 18-39: CPT | Performed by: OBSTETRICS & GYNECOLOGY

## 2021-03-04 PROCEDURE — 99395 PREV VISIT EST AGE 18-39: CPT | Performed by: NURSE PRACTITIONER

## 2021-03-04 PROCEDURE — 80061 LIPID PANEL: CPT

## 2021-03-04 PROCEDURE — 80053 COMPREHEN METABOLIC PANEL: CPT | Performed by: NURSE PRACTITIONER

## 2021-03-04 PROCEDURE — 74018 RADEX ABDOMEN 1 VIEW: CPT

## 2021-03-04 PROCEDURE — 84443 ASSAY THYROID STIM HORMONE: CPT

## 2021-03-04 PROCEDURE — 36415 COLL VENOUS BLD VENIPUNCTURE: CPT | Performed by: NURSE PRACTITIONER

## 2021-03-04 NOTE — PROGRESS NOTES
Do Miles is a 35 y.o. y/o female.     Chief Complaint: Here for Pap smear wants IUD out in for 5 years    HPI:   35 y.o. No obstetric history on file..  Patient's last menstrual period was 02/19/2021..  Patient is here for Pap smear its been 5 years.    Request IUD out in for treatment of menorrhagia.  She had felt the strings up till about 6 months ago and then was no longer to feel strings.    She is having significant menorrhagia with secondary anemia in says she wants to consider having hysterectomy.  She has multiple medical problems and will be high risk procedure will need medical optimization          Review of Systems     Constitutional: Denies night sweats    HENT: No hearing changes, denies ear pain    Eye: No eye pain; no foreign body in eye    Pulmonary: No hemoptysis    Cardiovascular: No claudication    GI: No hematemesis    Musculoskeletal: No arthralgias, no joint swelling    Endocrine: No polydipsia or polyuria    Hematologic: Denies any free bleeding    Psychiatric: Denies any delusions    The following portions of the patient's history were reviewed and updated as appropriate: allergies, current medications, past family history, past medical history, past social history, past surgical history and problem list.    Allergies   Allergen Reactions   • Latex    • Adhesive Tape Hives        Outpatient Medications Prior to Visit   Medication Sig Dispense Refill   • amLODIPine (Norvasc) 10 MG tablet Take 1 tablet by mouth Daily. 30 tablet 3   • buPROPion (WELLBUTRIN) 100 MG tablet Take 1 tablet by mouth Daily. 30 tablet 5   • busPIRone (BUSPAR) 10 MG tablet Take 1 tablet by mouth 3 (Three) Times a Day. 90 tablet 1   • cloNIDine (CATAPRES) 0.2 MG tablet Take 1 tablet by mouth every night at bedtime. 30 tablet 3   • ferrous sulfate 325 (65 FE) MG EC tablet Take 1 tablet by mouth 3 (Three) Times a Day With Meals. 90 tablet 3   • hydrALAZINE (APRESOLINE) 10 MG tablet Take 1 tablet by mouth 2  "(Two) Times a Day. 60 tablet 1   • Insulin Pen Needle (PEN NEEDLES 5/16\") 31G X 8 MM misc Use with Saxenda daily. 100 each 1   • labetalol (NORMODYNE) 200 MG tablet Take 1 tablet by mouth 2 (Two) Times a Day. 60 tablet 3   • levonorgestrel (MIRENA) 20 MCG/24HR IUD 1 each by Intrauterine route 1 (One) Time.     • nitrofurantoin, macrocrystal-monohydrate, (Macrobid) 100 MG capsule Take 1 capsule by mouth 2 (Two) Times a Day. 10 capsule 0     No facility-administered medications prior to visit.         The patient has a family history of   Family History   Problem Relation Age of Onset   • Hypertension Mother    • Uterine cancer Mother    • No Known Problems Father    • No Known Problems Sister    • No Known Problems Brother    • Eczema Daughter    • Hypertension Maternal Grandmother    • Hypertension Maternal Grandfather    • Diabetes Maternal Grandfather    • No Known Problems Paternal Grandmother    • No Known Problems Paternal Grandfather    • No Known Problems Sister    • Eczema Daughter         Past Medical History:   Diagnosis Date   • Allergic    • Anemia    • Anxiety    • Diabetes mellitus (CMS/HCC)     Gestational   • Heart murmur    • History of ear infections    • Hypertension    • Influenza    • Intrahepatic cholestasis of pregnancy    • Obesity    • Pneumonia    • Strep throat    • Urinary tract infection         OB History    No obstetric history on file.          Social History     Socioeconomic History   • Marital status:      Spouse name: Not on file   • Number of children: Not on file   • Years of education: Not on file   • Highest education level: Not on file   Tobacco Use   • Smoking status: Never Smoker   • Smokeless tobacco: Never Used   Substance and Sexual Activity   • Alcohol use: Yes     Frequency: Monthly or less   • Drug use: No   • Sexual activity: Yes     Partners: Male     Birth control/protection: Surgical        Past Surgical History:   Procedure Laterality Date   • ANKLE " "SURGERY      left ankle   •  SECTION     • DILATION AND CURETTAGE, DIAGNOSTIC / THERAPEUTIC     • HYSTEROSCOPY          Patient Active Problem List   Diagnosis   • Viral upper respiratory tract infection   • Obesity   • Anxiety   • Hypertension   • Malpositioned IUD, initial encounter   • IUD threads lost   • Women's annual routine gynecological examination        Documented Vitals    21 0827   BP: 134/84   Weight: (!) 136 kg (300 lb 3.2 oz)   Height: 157.5 cm (62\")        Body mass index is 54.91 kg/m².    Physical Exam  Constitutional: Appears to be in no acute distress; Eyes: sclera normal; Endocrine system: thyroid palpate is normal; Pulmonary system: lungs clear; Cardiovascular system: heart regular rate and rhythm; Gastrointestinal system: abdomen soft nontender, active bowel sounds; Urologic system: CVA negative; Psychiatric: appropriate insight; Neurologic: gait within normal limits female genital system external genitalia normal vagina normal cervix no gross lesion Pap smear obtained I was not able to find the IUD strings I searched the cervix with a Cytobrush and could not bring them down.  Bimanual examination is negative    Laboratory Data:   Lab Results - Last 18 Months   Lab Units 20  0756   GLUCOSE mg/dL 97   BUN mg/dL 11   CREATININE mg/dL 0.74   SODIUM mmol/L 137   POTASSIUM mmol/L 4.3   CHLORIDE mmol/L 100   CO2 mmol/L 24.9   CALCIUM mg/dL 10.0   TOTAL PROTEIN g/dL 7.2   ALBUMIN g/dL 4.30   ALT (SGPT) U/L 17   AST (SGOT) U/L 16   ALK PHOS U/L 98   BILIRUBIN mg/dL 0.2   EGFR IF NONAFRICN AM mL/min/1.73 90   GLOBULIN gm/dL 2.9   A/G RATIO g/dL 1.5   BUN / CREAT RATIO  14.9   ANION GAP mmol/L 12.1     No results for input(s): WBC, RBC, HGB, HCT, MCV, MCH, MCHC, RDW, RDWSD, MPV, PLT in the last 86915 hours.  No results for input(s): HCGQUAL in the last 22391 hours.    Assessment        Diagnosis Plan   1. Women's annual routine gynecological examination  Liquid-based Pap Smear, " Screening   2. Intrauterine contraceptive device threads lost, initial encounter  US Non-ob Transvaginal IUD not visible on ultrasound of pelvis both abdominal and pelvic Will obtain KUB.  Results reviewed with patient possibility of migration through uterine wall.  Wants to be called with the results patient's not having any pain or other problems.   3. Malpositioned IUD, initial encounter  XR Abdomen KUB         Plan       No orders of the defined types were placed in this encounter.                                                                                                                                      Counseling              Nutrition/activity/weight BMI 55 understands relationship between weight, exercise and caloric intake            Family planning status post tubal sterilization            Tobacco/alcohol/illicit drug does not smoke this greatly encouraged            Immunization has had Covid vaccine        This document has been electronically signed by Grady Ocampo MD on March 4, 2021 09:31 CST    Please note that portions of this note were completed with a voice recognition program.

## 2021-03-04 NOTE — PROGRESS NOTES
"Subjective   Do Miles is a 35 y.o. female.  Annual physical exam.  Has high blood pressure, morbid obesity and anxiety.  \"I really need help losing weight.  I have really been trying to diet and exercise and I just cannot seem to drop it.\"  Has tried Contrave, Belviq, Phentermine, calorie counting and low carbohydrate intake with no significant weight loss.    Hypertension  This is a chronic problem. The current episode started more than 1 year ago. The problem is unchanged. The problem is controlled. Associated symptoms include anxiety. Pertinent negatives include no blurred vision, chest pain or shortness of breath. There are no associated agents to hypertension. Risk factors for coronary artery disease include family history, obesity, sedentary lifestyle and stress. Current antihypertension treatment includes ACE inhibitors and beta blockers. The current treatment provides moderate improvement. Compliance problems include exercise and diet.    Obesity  This is a chronic problem. The current episode started more than 1 year ago. The problem occurs constantly. The problem has been unchanged. Pertinent negatives include no chest pain, chills, coughing, diaphoresis, fatigue, fever or sore throat. The symptoms are aggravated by drinking and eating. Treatments tried: Low calorie diet, low carbohydrate diet, Belviq, Contrave, phentermine. The treatment provided no relief.   Anxiety  Presents for follow-up visit. Onset was 1 to 5 years ago. The problem has been rapidly worsening. Symptoms include depressed mood. Patient reports no chest pain, confusion or shortness of breath. Symptoms occur constantly. The severity of symptoms is moderate. The symptoms are aggravated by family issues. The quality of sleep is fair. Nighttime awakenings: occasional.     There are no known risk factors. Her past medical history is significant for anxiety/panic attacks. Past treatments include nothing. Compliance with " "medications is %.        The following portions of the patient's history were reviewed and updated as appropriate:     Current Outpatient Medications   Medication Sig Dispense Refill   • amLODIPine (Norvasc) 10 MG tablet Take 1 tablet by mouth Daily. 30 tablet 3   • buPROPion (WELLBUTRIN) 100 MG tablet Take 1 tablet by mouth Daily. 30 tablet 5   • busPIRone (BUSPAR) 10 MG tablet Take 1 tablet by mouth 3 (Three) Times a Day. 90 tablet 1   • cloNIDine (CATAPRES) 0.2 MG tablet Take 1 tablet by mouth every night at bedtime. 30 tablet 3   • ferrous sulfate 325 (65 FE) MG EC tablet Take 1 tablet by mouth 3 (Three) Times a Day With Meals. 90 tablet 3   • hydrALAZINE (APRESOLINE) 10 MG tablet Take 1 tablet by mouth 2 (Two) Times a Day. 60 tablet 1   • Insulin Pen Needle (PEN NEEDLES 5/16\") 31G X 8 MM misc Use with Saxenda daily. 100 each 1   • labetalol (NORMODYNE) 200 MG tablet Take 1 tablet by mouth 2 (Two) Times a Day. 60 tablet 3   • levonorgestrel (MIRENA) 20 MCG/24HR IUD 1 each by Intrauterine route 1 (One) Time.     • nitrofurantoin, macrocrystal-monohydrate, (Macrobid) 100 MG capsule Take 1 capsule by mouth 2 (Two) Times a Day. 10 capsule 0     No current facility-administered medications for this visit.      Current Outpatient Medications on File Prior to Visit   Medication Sig   • amLODIPine (Norvasc) 10 MG tablet Take 1 tablet by mouth Daily.   • buPROPion (WELLBUTRIN) 100 MG tablet Take 1 tablet by mouth Daily.   • busPIRone (BUSPAR) 10 MG tablet Take 1 tablet by mouth 3 (Three) Times a Day.   • cloNIDine (CATAPRES) 0.2 MG tablet Take 1 tablet by mouth every night at bedtime.   • ferrous sulfate 325 (65 FE) MG EC tablet Take 1 tablet by mouth 3 (Three) Times a Day With Meals.   • hydrALAZINE (APRESOLINE) 10 MG tablet Take 1 tablet by mouth 2 (Two) Times a Day.   • Insulin Pen Needle (PEN NEEDLES 5/16\") 31G X 8 MM misc Use with Saxenda daily.   • labetalol (NORMODYNE) 200 MG tablet Take 1 tablet by mouth 2 " "(Two) Times a Day.   • levonorgestrel (MIRENA) 20 MCG/24HR IUD 1 each by Intrauterine route 1 (One) Time.   • nitrofurantoin, macrocrystal-monohydrate, (Macrobid) 100 MG capsule Take 1 capsule by mouth 2 (Two) Times a Day.     No current facility-administered medications on file prior to visit.      She is allergic to latex and adhesive tape..    Review of Systems   Constitutional: Negative.  Negative for chills, diaphoresis, fatigue and fever.   HENT: Negative.  Negative for sore throat.    Eyes: Negative.  Negative for blurred vision.   Respiratory: Negative.  Negative for cough and shortness of breath.    Cardiovascular: Negative.  Negative for chest pain.   Gastrointestinal: Negative.    Genitourinary: Negative.    Musculoskeletal: Negative.    Skin: Negative.    Neurological: Negative.    Psychiatric/Behavioral: Negative.  Negative for confusion.       Objective    Visit Vitals  /88   Ht 157.5 cm (62\")   Wt (!) 136 kg (300 lb 3.2 oz)   LMP 02/19/2021   BMI 54.91 kg/m²       Physical Exam  Vitals signs and nursing note reviewed.   Constitutional:       Appearance: Normal appearance. She is well-developed. She is morbidly obese.   HENT:      Head: Normocephalic.      Right Ear: External ear normal.      Left Ear: External ear normal.   Eyes:      Pupils: Pupils are equal, round, and reactive to light.   Neck:      Musculoskeletal: Normal range of motion and neck supple.   Cardiovascular:      Rate and Rhythm: Normal rate and regular rhythm.      Heart sounds: Normal heart sounds.   Pulmonary:      Effort: Pulmonary effort is normal.      Breath sounds: Normal breath sounds.   Chest:      Comments: Bilateral, manual breast exam performed no dimpling, puckering, masses or nodules palpated  Abdominal:      General: Bowel sounds are normal.      Palpations: Abdomen is soft.   Genitourinary:     Comments: Deferred to gynecologist  Musculoskeletal: Normal range of motion.   Skin:     General: Skin is warm.      " Capillary Refill: Capillary refill takes less than 2 seconds.   Neurological:      Mental Status: She is alert and oriented to person, place, and time.   Psychiatric:         Behavior: Behavior normal.         Assessment/Plan   Diagnoses and all orders for this visit:    1. Annual physical exam (Primary)    2. Essential hypertension    3. Morbid (severe) obesity due to excess calories (CMS/[facility])  -     Ambulatory Referral to Bariatric Surgery    4. Anxiety      Continue on Norvasc, Catapres, Apresoline and Normodyne as previously ordered  Continue on Wellbutrin and BuSpar as previously ordered  Begin Saxenda as prescribed  Lot #[id number]  EXP: 05/2022  Educated on possible side effects of this medication including but not limited to increased risk for medullary thyroid cancer, type II endocrine neoplasia and pancreatitis  Encouraged to reduce daily caloric intake to no more than 2000 giacomo/day  Discussed using a food journal to improve self-care ability  Educated on importance of increased physical activity regimen to at least a minimum of 30 minutes/day for 5 days/week  Referral placed to Dr. Chow for bariatric services and will call to schedule appointment  Continue with stress reducing techniques    Continue on current medications as previously prescribed   I spent 25 minutes charting and in face to face contact with patient.  Greater than 50% of this time was spent counseling patient and discussing plan of care.  Return in about 3 months (around 6/4/2021) for Recheck OBESITY .        This document has been electronically signed by JEWELS Sierra on March 4, 2021 13:38 CST         This document has been electronically signed by JEWELS Sierra on March 4, 2021 13:38 CST

## 2021-03-09 LAB
LAB AP CASE REPORT: NORMAL
PATH INTERP SPEC-IMP: NORMAL

## 2021-03-12 DIAGNOSIS — F41.9 ANXIETY: ICD-10-CM

## 2021-03-12 RX ORDER — BUSPIRONE HYDROCHLORIDE 10 MG/1
10 TABLET ORAL 3 TIMES DAILY
Qty: 90 TABLET | Refills: 1 | Status: SHIPPED | OUTPATIENT
Start: 2021-03-12 | End: 2021-07-09 | Stop reason: SDUPTHER

## 2021-05-19 DIAGNOSIS — I10 ESSENTIAL HYPERTENSION: ICD-10-CM

## 2021-05-19 RX ORDER — HYDRALAZINE HYDROCHLORIDE 10 MG/1
10 TABLET, FILM COATED ORAL 2 TIMES DAILY
Qty: 60 TABLET | Refills: 1 | Status: SHIPPED | OUTPATIENT
Start: 2021-05-19 | End: 2021-08-09 | Stop reason: SDUPTHER

## 2021-07-09 DIAGNOSIS — F41.9 ANXIETY: ICD-10-CM

## 2021-07-09 RX ORDER — BUSPIRONE HYDROCHLORIDE 10 MG/1
10 TABLET ORAL 3 TIMES DAILY
Qty: 90 TABLET | Refills: 1 | Status: SHIPPED | OUTPATIENT
Start: 2021-07-09 | End: 2021-08-16 | Stop reason: SDUPTHER

## 2021-08-03 DIAGNOSIS — I10 ESSENTIAL HYPERTENSION: ICD-10-CM

## 2021-08-03 DIAGNOSIS — F41.9 ANXIETY: ICD-10-CM

## 2021-08-04 RX ORDER — BUPROPION HYDROCHLORIDE 100 MG/1
100 TABLET ORAL DAILY
Qty: 90 TABLET | Refills: 3 | Status: SHIPPED | OUTPATIENT
Start: 2021-08-04 | End: 2022-06-01 | Stop reason: SDUPTHER

## 2021-08-04 RX ORDER — AMLODIPINE BESYLATE 10 MG/1
10 TABLET ORAL DAILY
Qty: 90 TABLET | Refills: 3 | Status: SHIPPED | OUTPATIENT
Start: 2021-08-04 | End: 2022-06-01 | Stop reason: SDUPTHER

## 2021-08-09 DIAGNOSIS — I10 ESSENTIAL HYPERTENSION: ICD-10-CM

## 2021-08-09 RX ORDER — HYDRALAZINE HYDROCHLORIDE 10 MG/1
10 TABLET, FILM COATED ORAL 2 TIMES DAILY
Qty: 60 TABLET | Refills: 1 | Status: SHIPPED | OUTPATIENT
Start: 2021-08-09 | End: 2021-11-15 | Stop reason: SDUPTHER

## 2021-08-09 RX ORDER — CLONIDINE HYDROCHLORIDE 0.2 MG/1
0.2 TABLET ORAL
Qty: 30 TABLET | Refills: 3 | Status: SHIPPED | OUTPATIENT
Start: 2021-08-09 | End: 2021-11-15 | Stop reason: SDUPTHER

## 2021-08-09 RX ORDER — LABETALOL 200 MG/1
200 TABLET, FILM COATED ORAL 2 TIMES DAILY
Qty: 60 TABLET | Refills: 3 | Status: SHIPPED | OUTPATIENT
Start: 2021-08-09 | End: 2021-11-15 | Stop reason: SDUPTHER

## 2021-08-16 DIAGNOSIS — F41.9 ANXIETY: ICD-10-CM

## 2021-08-17 RX ORDER — BUSPIRONE HYDROCHLORIDE 10 MG/1
10 TABLET ORAL 3 TIMES DAILY
Qty: 90 TABLET | Refills: 1 | Status: SHIPPED | OUTPATIENT
Start: 2021-08-17 | End: 2022-01-10 | Stop reason: SDUPTHER

## 2021-09-06 ENCOUNTER — TELEMEDICINE (OUTPATIENT)
Dept: FAMILY MEDICINE CLINIC | Facility: TELEHEALTH | Age: 36
End: 2021-09-06

## 2021-09-06 DIAGNOSIS — Z20.822 SUSPECTED COVID-19 VIRUS INFECTION: Primary | ICD-10-CM

## 2021-09-06 PROCEDURE — U0004 COV-19 TEST NON-CDC HGH THRU: HCPCS | Performed by: FAMILY MEDICINE

## 2021-09-06 PROCEDURE — BHEMPVIDEOVISIT: Performed by: NURSE PRACTITIONER

## 2021-09-06 NOTE — PATIENT INSTRUCTIONS
10 Things You Can Do to Manage Your COVID-19 Symptoms at Home  If you have possible or confirmed COVID-19:  1. Stay home from work and school. And stay away from other public places. If you must go out, avoid using any kind of public transportation, ridesharing, or taxis.  2. Monitor your symptoms carefully. If your symptoms get worse, call your healthcare provider immediately.  3. Get rest and stay hydrated.  4. If you have a medical appointment, call the healthcare provider ahead of time and tell them that you have or may have COVID-19.  5. For medical emergencies, call 911 and notify the dispatch personnel that you have or may have COVID-19.  6. Cover your cough and sneezes with a tissue or use the inside of your elbow.  7. Wash your hands often with soap and water for at least 20 seconds or clean your hands with an alcohol-based hand  that contains at least 60% alcohol.  8. As much as possible, stay in a specific room and away from other people in your home. Also, you should use a separate bathroom, if available. If you need to be around other people in or outside of the home, wear a mask.  9. Avoid sharing personal items with other people in your household, like dishes, towels, and bedding.  10. Clean all surfaces that are touched often, like counters, tabletops, and doorknobs. Use household cleaning sprays or wipes according to the label instructions.  cdc.gov/coronavirus  07/01/2020  This information is not intended to replace advice given to you by your health care provider. Make sure you discuss any questions you have with your health care provider.  Document Revised: 04/15/2021 Document Reviewed: 04/15/2021  Elsevier Patient Education © 2021 [employer]

## 2021-09-06 NOTE — PROGRESS NOTES
CHIEF COMPLAINT  Chief Complaint   Patient presents with   • covid test         HPI  Do Miles is a 36 y.o. female  presents with complaint of 3 day history of nasal congestion, intermittent cough, intermittent fever 100.2-100.4, unable to smell or taste.  Taking tylenol for fever; would like to be tested for COVID-19.  Is fully vaccinated    Review of Systems   Constitutional: Positive for fever.        Loss of taste and smell     HENT: Positive for congestion.    Respiratory: Positive for cough.    All other systems reviewed and are negative.      Past Medical History:   Diagnosis Date   • Allergic    • Anemia    • Anxiety    • Diabetes mellitus (CMS/HCC)     Gestational   • Heart murmur    • History of ear infections    • Hypertension    • Influenza    • Intrahepatic cholestasis of pregnancy    • Obesity    • Pneumonia    • Strep throat    • Urinary tract infection        Family History   Problem Relation Age of Onset   • Hypertension Mother    • Uterine cancer Mother    • No Known Problems Father    • No Known Problems Sister    • No Known Problems Brother    • Eczema Daughter    • Hypertension Maternal Grandmother    • Hypertension Maternal Grandfather    • Diabetes Maternal Grandfather    • No Known Problems Paternal Grandmother    • No Known Problems Paternal Grandfather    • No Known Problems Sister    • Eczema Daughter        Social History     Socioeconomic History   • Marital status:      Spouse name: Not on file   • Number of children: Not on file   • Years of education: Not on file   • Highest education level: Not on file   Tobacco Use   • Smoking status: Never Smoker   • Smokeless tobacco: Never Used   Substance and Sexual Activity   • Alcohol use: Yes   • Drug use: No   • Sexual activity: Yes     Partners: Male     Birth control/protection: Surgical         There were no vitals taken for this visit.    PHYSICAL EXAM  Physical Exam   Constitutional: She is oriented to person, place,  and time. She appears well-developed and well-nourished. She does not have a sickly appearance. She appears ill.   HENT:   Head: Normocephalic and atraumatic.   Pulmonary/Chest: Effort normal.  No respiratory distress (occasional cough during visit).  Neurological: She is alert and oriented to person, place, and time.         Diagnoses and all orders for this visit:    1. Suspected COVID-19 virus infection (Primary)  -     COVID-19,[facility] ROUTINE, NP/OP SWAB IN TRANSPORT MEDIA OR ESWAB 72 HR TAT - Swab, Nasopharynx; Future    --COVID-19 test to rule out infection  --quarantine and symptomatic treatment discussed      FOLLOW-UP  As discussed during visit with PCP/[facility] if no improvement or Urgent Care/Emergency Department if worsening of symptoms    Patient verbalizes understanding of medication dosage, comfort measures, instructions for treatment and follow-up.    EJWELS Solano  09/06/2021  08:57 EDT    This visit was performed via Telehealth.  This patient has been instructed to follow-up with their primary care provider if their symptoms worsen or the treatment provided does not resolve their illness.

## 2021-10-08 ENCOUNTER — APPOINTMENT (OUTPATIENT)
Dept: VACCINE CLINIC | Facility: HOSPITAL | Age: 36
End: 2021-10-08

## 2021-11-15 DIAGNOSIS — I10 ESSENTIAL HYPERTENSION: ICD-10-CM

## 2021-11-15 RX ORDER — LABETALOL 200 MG/1
200 TABLET, FILM COATED ORAL 2 TIMES DAILY
Qty: 60 TABLET | Refills: 1 | Status: SHIPPED | OUTPATIENT
Start: 2021-11-15 | End: 2022-02-04 | Stop reason: SDUPTHER

## 2021-11-15 RX ORDER — HYDRALAZINE HYDROCHLORIDE 10 MG/1
10 TABLET, FILM COATED ORAL 2 TIMES DAILY
Qty: 60 TABLET | Refills: 1 | Status: SHIPPED | OUTPATIENT
Start: 2021-11-15 | End: 2022-02-04 | Stop reason: SDUPTHER

## 2021-11-15 RX ORDER — CLONIDINE HYDROCHLORIDE 0.2 MG/1
0.2 TABLET ORAL
Qty: 30 TABLET | Refills: 1 | Status: SHIPPED | OUTPATIENT
Start: 2021-11-15 | End: 2022-02-04 | Stop reason: SDUPTHER

## 2022-01-10 DIAGNOSIS — F41.9 ANXIETY: ICD-10-CM

## 2022-01-10 RX ORDER — BUSPIRONE HYDROCHLORIDE 10 MG/1
10 TABLET ORAL 3 TIMES DAILY
Qty: 90 TABLET | Refills: 0 | Status: SHIPPED | OUTPATIENT
Start: 2022-01-10 | End: 2022-02-04 | Stop reason: SDUPTHER

## 2022-01-10 NOTE — TELEPHONE ENCOUNTER
JEWELS Hurley,    I called and talked to Ms. Edmonds to let her know that she is past due for her follow-up appointment with you.    Last OV 03/04/2021, was asked to RTC in 3 months, PC appointment in June.     She is requesting to do a Telehealth visit since she works across the street now, if not she will work something out to come over and see you.     I told her I would have to ask since in March it will be a year since her last OV  (90 day supply sent with no refill today)    Please advise on telehealth visit.    AUNG Marinelli

## 2022-02-04 DIAGNOSIS — F41.9 ANXIETY: ICD-10-CM

## 2022-02-04 DIAGNOSIS — I10 ESSENTIAL HYPERTENSION: ICD-10-CM

## 2022-02-07 RX ORDER — CLONIDINE HYDROCHLORIDE 0.2 MG/1
0.2 TABLET ORAL
Qty: 30 TABLET | Refills: 1 | Status: SHIPPED | OUTPATIENT
Start: 2022-02-07 | End: 2022-04-08 | Stop reason: SDUPTHER

## 2022-02-07 RX ORDER — BUSPIRONE HYDROCHLORIDE 10 MG/1
10 TABLET ORAL 3 TIMES DAILY
Qty: 90 TABLET | Refills: 0 | Status: SHIPPED | OUTPATIENT
Start: 2022-02-07 | End: 2022-03-09 | Stop reason: SDUPTHER

## 2022-02-07 RX ORDER — LABETALOL 200 MG/1
200 TABLET, FILM COATED ORAL 2 TIMES DAILY
Qty: 60 TABLET | Refills: 1 | Status: SHIPPED | OUTPATIENT
Start: 2022-02-07 | End: 2022-04-08 | Stop reason: SDUPTHER

## 2022-02-07 RX ORDER — HYDRALAZINE HYDROCHLORIDE 10 MG/1
10 TABLET, FILM COATED ORAL 2 TIMES DAILY
Qty: 60 TABLET | Refills: 1 | Status: SHIPPED | OUTPATIENT
Start: 2022-02-07 | End: 2022-04-08 | Stop reason: SDUPTHER

## 2022-03-09 DIAGNOSIS — F41.9 ANXIETY: ICD-10-CM

## 2022-03-09 RX ORDER — BUSPIRONE HYDROCHLORIDE 10 MG/1
10 TABLET ORAL 3 TIMES DAILY
Qty: 90 TABLET | Refills: 0 | Status: SHIPPED | OUTPATIENT
Start: 2022-03-09 | End: 2022-04-08 | Stop reason: SDUPTHER

## 2022-03-09 NOTE — TELEPHONE ENCOUNTER
Last OV 03/04/2021  Message sent via [employer] letting her know that she is due for her Annual Exam asking her to call and schedule her appointment before a refill is needed in 30 days.

## 2022-03-25 DIAGNOSIS — J01.11 ACUTE RECURRENT FRONTAL SINUSITIS: Primary | ICD-10-CM

## 2022-03-25 RX ORDER — AZITHROMYCIN 250 MG/1
TABLET, FILM COATED ORAL
Qty: 6 TABLET | Refills: 0 | Status: SHIPPED | OUTPATIENT
Start: 2022-03-25 | End: 2022-04-05

## 2022-04-05 ENCOUNTER — OFFICE VISIT (OUTPATIENT)
Dept: FAMILY MEDICINE CLINIC | Facility: CLINIC | Age: 37
End: 2022-04-05

## 2022-04-05 VITALS
WEIGHT: 293 LBS | BODY MASS INDEX: 53.92 KG/M2 | DIASTOLIC BLOOD PRESSURE: 68 MMHG | SYSTOLIC BLOOD PRESSURE: 126 MMHG | HEIGHT: 62 IN | OXYGEN SATURATION: 99 % | HEART RATE: 71 BPM

## 2022-04-05 DIAGNOSIS — Z13.1 SCREENING FOR DIABETES MELLITUS: ICD-10-CM

## 2022-04-05 DIAGNOSIS — E66.01 MORBID (SEVERE) OBESITY DUE TO EXCESS CALORIES: ICD-10-CM

## 2022-04-05 DIAGNOSIS — Z13.220 SCREENING FOR HYPERLIPIDEMIA: ICD-10-CM

## 2022-04-05 DIAGNOSIS — Z00.00 ANNUAL PHYSICAL EXAM: Primary | ICD-10-CM

## 2022-04-05 DIAGNOSIS — F41.9 ANXIETY: ICD-10-CM

## 2022-04-05 DIAGNOSIS — Z13.29 SCREENING FOR HYPOTHYROIDISM: ICD-10-CM

## 2022-04-05 DIAGNOSIS — Z23 NEED FOR DIPHTHERIA-TETANUS-PERTUSSIS (TDAP) VACCINE: ICD-10-CM

## 2022-04-05 DIAGNOSIS — I10 ESSENTIAL HYPERTENSION: ICD-10-CM

## 2022-04-05 PROCEDURE — 90715 TDAP VACCINE 7 YRS/> IM: CPT | Performed by: NURSE PRACTITIONER

## 2022-04-05 PROCEDURE — 90471 IMMUNIZATION ADMIN: CPT | Performed by: NURSE PRACTITIONER

## 2022-04-05 PROCEDURE — 99395 PREV VISIT EST AGE 18-39: CPT | Performed by: NURSE PRACTITIONER

## 2022-04-05 NOTE — PROGRESS NOTES
"Chief Complaint  Annual Exam    Subjective  Annual physical exam.  Stress levels have been increased over the past month due to her 's recent medical scare \"but everything so far has come back good.\"        Do Miles presents to [facility] [place]  Hypertension  This is a chronic problem. The current episode started more than 1 year ago. The problem is unchanged. The problem is controlled. Associated symptoms include anxiety. Pertinent negatives include no blurred vision, chest pain or shortness of breath. There are no associated agents to hypertension. Risk factors for coronary artery disease include family history, obesity, sedentary lifestyle and stress. Current antihypertension treatment includes ACE inhibitors, beta blockers, central alpha agonists and calcium channel blockers. The current treatment provides moderate improvement. Compliance problems include exercise and diet.    Obesity  This is a chronic problem. The current episode started more than 1 year ago. The problem occurs constantly. The problem has been unchanged. Pertinent negatives include no chest pain, chills, coughing, diaphoresis, fatigue, fever or sore throat. The symptoms are aggravated by drinking and eating. Treatments tried: Low calorie diet, low carbohydrate diet, Belviq, Contrave, phentermine. The treatment provided no relief.   Anxiety  Presents for initial visit. Onset was 1 to 5 years ago. The problem has been gradually improving. Symptoms include depressed mood. Patient reports no chest pain, confusion or shortness of breath. Symptoms occur most days. The severity of symptoms is moderate. The symptoms are aggravated by family issues. The quality of sleep is fair. Nighttime awakenings: occasional.     There are no known risk factors. Her past medical history is significant for anxiety/panic attacks. Past treatments include nothing. Compliance with medications is %. " "    Current Outpatient Medications on File Prior to Visit   Medication Sig Dispense Refill   • amLODIPine (Norvasc) 10 MG tablet Take 1 tablet by mouth Daily. 90 tablet 3   • buPROPion (WELLBUTRIN) 100 MG tablet Take 1 tablet by mouth Daily. 90 tablet 3   • busPIRone (BUSPAR) 10 MG tablet Take 1 tablet by mouth 3 (Three) Times a Day. 90 tablet 0   • cloNIDine (CATAPRES) 0.2 MG tablet Take 1 tablet by mouth every night at bedtime. 30 tablet 1   • ferrous sulfate 325 (65 FE) MG EC tablet Take 1 tablet by mouth 3 (Three) Times a Day With Meals. 90 tablet 3   • hydrALAZINE (APRESOLINE) 10 MG tablet Take 1 tablet by mouth 2 (Two) Times a Day. 60 tablet 1   • labetalol (NORMODYNE) 200 MG tablet Take 1 tablet by mouth 2 (Two) Times a Day. 60 tablet 1   • [DISCONTINUED] azithromycin (Zithromax Z-Benito) 250 MG tablet Take 2 tablets by mouth today, then 1 tablet daily on days 2 through 5. (Patient taking differently: Take 2 tablets by mouth today, then 1 tablet daily on days 2 through 5.) 6 tablet 0   • [DISCONTINUED] Insulin Pen Needle (PEN NEEDLES 5/16\") 31G X 8 MM misc Use with Saxenda daily. 100 each 1   • [DISCONTINUED] levonorgestrel (MIRENA) 20 MCG/24HR IUD 1 each by Intrauterine route 1 (One) Time.     • [DISCONTINUED] nitrofurantoin, macrocrystal-monohydrate, (Macrobid) 100 MG capsule Take 1 capsule by mouth 2 (Two) Times a Day. 10 capsule 0     No current facility-administered medications on file prior to visit.     Allergies   Allergen Reactions   • Latex    • Adhesive Tape Hives       Objective   Vital Signs:   /68   Pulse 71   Ht 157.5 cm (62\")   Wt 134 kg (295 lb)   SpO2 99%   BMI 53.96 kg/m²            Physical Exam  Vitals and nursing note reviewed.   Constitutional:       Appearance: Normal appearance. She is well-developed. She is morbidly obese.   HENT:      Head: Normocephalic and atraumatic.      Right Ear: Tympanic membrane, ear canal and external ear normal.      Left Ear: Tympanic membrane, " ear canal and external ear normal.      Nose: Nose normal.      Mouth/Throat:      Mouth: Mucous membranes are moist.      Pharynx: Oropharynx is clear.   Eyes:      Extraocular Movements: Extraocular movements intact.      Conjunctiva/sclera: Conjunctivae normal.      Pupils: Pupils are equal, round, and reactive to light.   Cardiovascular:      Rate and Rhythm: Normal rate and regular rhythm.      Pulses: Normal pulses.      Heart sounds: Normal heart sounds.   Pulmonary:      Effort: Pulmonary effort is normal.      Breath sounds: Normal breath sounds.   Chest:      Comments: Bilateral, manual breast exam performed and no dimpling, puckering, masses or nodules palpated    Abdominal:      General: Bowel sounds are normal.      Palpations: Abdomen is soft.   Musculoskeletal:         General: Normal range of motion.      Cervical back: Normal range of motion and neck supple.   Skin:     General: Skin is warm.      Capillary Refill: Capillary refill takes less than 2 seconds.   Neurological:      Mental Status: She is alert and oriented to person, place, and time.   Psychiatric:         Behavior: Behavior normal.        Result Review :                   Assessment and Plan    Diagnoses and all orders for this visit:    1. Annual physical exam (Primary)    2. Essential hypertension  -     CBC & Differential; Future  -     Comprehensive Metabolic Panel; Future    3. Morbid (severe) obesity due to excess calories (HCC)    4. Anxiety    5. Need for diphtheria-tetanus-pertussis (Tdap) vaccine  -     Tdap Vaccine Greater Than or Equal To 8yo IM    6. Screening for hypothyroidism  -     TSH; Future    7. Screening for hyperlipidemia  -     Lipid Panel; Future    8. Screening for diabetes mellitus  -     Hemoglobin A1c; Future    1.  Annual physical exam:  Continue on current medications as previously prescribed   Counseling on importance of heathy eating habits and regular physical activity regimen on improving overall  physical and mental health.     2.  Essential hypertension:  Complete CBC and chemistry panel as ordered and will notify results when available  Continue on amlodipine, clonidine, hydralazine and labetalol as previously prescribed  Continue on low-sodium diet of no more than 1500 mg/day  Avoid adding salt to food when cooking    3.  Morbid severe obesity due to excess calories:  Patient's Body mass index is 53.96 kg/m². indicating that she is morbidly obese (BMI > 40 or > 35 with obesity - related health condition). Obesity-related health conditions include the following: hypertension. Obesity is unchanged. BMI is is above average; BMI management plan is completed. We discussed portion control and increasing exercise..    4.  Anxiety:  Continue on Wellbutrin and BuSpar as previously prescribed  Continue stress reducing techniques such as deep breathing and meditation    5.  .  Need for Tdap vaccine:  Tdap vaccine given IM in office  Educated on possible injection site reactions  Educated vaccine therapy will be good for 10 years    6.  Screening for hypothyroidism:  Complete TSH as ordered and will notify of results when available    7.  Screening for hyperlipidemia:  Complete fasting lipid panel as ordered and will notify of results when available    8.  Screening for diabetes mellitus:  Complete hemoglobin A1c as ordered and will notify results when available    I spent 28 minutes caring for Do on this date of service. This time includes time spent by me in the following activities:preparing for the visit, reviewing tests, obtaining and/or reviewing a separately obtained history, performing a medically appropriate examination and/or evaluation , counseling and educating the patient/family/caregiver, ordering medications, tests, or procedures and documenting information in the medical record  Follow Up   Return in about 1 year (around 4/5/2023) for Annual physical.  Patient was given instructions and  counseling regarding her condition or for health maintenance advice. Please see specific information pulled into the AVS if appropriate.         This document has been electronically signed by JEWELS Sierra on April 5, 2022 10:31 CDT

## 2022-04-08 DIAGNOSIS — I10 ESSENTIAL HYPERTENSION: ICD-10-CM

## 2022-04-08 DIAGNOSIS — F41.9 ANXIETY: ICD-10-CM

## 2022-04-08 RX ORDER — CLONIDINE HYDROCHLORIDE 0.2 MG/1
0.2 TABLET ORAL
Qty: 30 TABLET | Refills: 1 | Status: SHIPPED | OUTPATIENT
Start: 2022-04-08 | End: 2022-06-01 | Stop reason: SDUPTHER

## 2022-04-08 RX ORDER — LABETALOL 200 MG/1
200 TABLET, FILM COATED ORAL 2 TIMES DAILY
Qty: 60 TABLET | Refills: 1 | Status: SHIPPED | OUTPATIENT
Start: 2022-04-08 | End: 2022-06-01 | Stop reason: SDUPTHER

## 2022-04-08 RX ORDER — BUSPIRONE HYDROCHLORIDE 10 MG/1
10 TABLET ORAL 3 TIMES DAILY
Qty: 90 TABLET | Refills: 0 | Status: SHIPPED | OUTPATIENT
Start: 2022-04-08 | End: 2022-05-04 | Stop reason: SDUPTHER

## 2022-04-08 RX ORDER — HYDRALAZINE HYDROCHLORIDE 10 MG/1
10 TABLET, FILM COATED ORAL 2 TIMES DAILY
Qty: 60 TABLET | Refills: 1 | Status: SHIPPED | OUTPATIENT
Start: 2022-04-08 | End: 2022-06-30 | Stop reason: SDUPTHER

## 2022-05-04 DIAGNOSIS — F41.9 ANXIETY: ICD-10-CM

## 2022-05-04 RX ORDER — BUSPIRONE HYDROCHLORIDE 10 MG/1
10 TABLET ORAL 3 TIMES DAILY
Qty: 90 TABLET | Refills: 0 | Status: SHIPPED | OUTPATIENT
Start: 2022-05-04 | End: 2022-06-01 | Stop reason: SDUPTHER

## 2022-06-01 DIAGNOSIS — F41.9 ANXIETY: ICD-10-CM

## 2022-06-01 DIAGNOSIS — I10 ESSENTIAL HYPERTENSION: ICD-10-CM

## 2022-06-01 RX ORDER — CLONIDINE HYDROCHLORIDE 0.2 MG/1
0.2 TABLET ORAL
Qty: 30 TABLET | Refills: 1 | Status: SHIPPED | OUTPATIENT
Start: 2022-06-01 | End: 2022-08-08 | Stop reason: SDUPTHER

## 2022-06-01 RX ORDER — AMLODIPINE BESYLATE 10 MG/1
10 TABLET ORAL DAILY
Qty: 90 TABLET | Refills: 3 | Status: SHIPPED | OUTPATIENT
Start: 2022-06-01

## 2022-06-01 RX ORDER — LABETALOL 200 MG/1
200 TABLET, FILM COATED ORAL 2 TIMES DAILY
Qty: 60 TABLET | Refills: 1 | Status: SHIPPED | OUTPATIENT
Start: 2022-06-01 | End: 2022-08-08 | Stop reason: SDUPTHER

## 2022-06-01 RX ORDER — BUPROPION HYDROCHLORIDE 100 MG/1
100 TABLET ORAL DAILY
Qty: 90 TABLET | Refills: 3 | Status: SHIPPED | OUTPATIENT
Start: 2022-06-01

## 2022-06-01 RX ORDER — BUSPIRONE HYDROCHLORIDE 10 MG/1
10 TABLET ORAL 3 TIMES DAILY
Qty: 90 TABLET | Refills: 0 | Status: SHIPPED | OUTPATIENT
Start: 2022-06-01

## 2022-06-30 DIAGNOSIS — I10 ESSENTIAL HYPERTENSION: ICD-10-CM

## 2022-06-30 RX ORDER — HYDRALAZINE HYDROCHLORIDE 10 MG/1
10 TABLET, FILM COATED ORAL 2 TIMES DAILY
Qty: 60 TABLET | Refills: 1 | Status: SHIPPED | OUTPATIENT
Start: 2022-06-30 | End: 2022-10-06 | Stop reason: SDUPTHER

## 2022-08-08 DIAGNOSIS — I10 ESSENTIAL HYPERTENSION: ICD-10-CM

## 2022-08-08 RX ORDER — CLONIDINE HYDROCHLORIDE 0.2 MG/1
0.2 TABLET ORAL
Qty: 30 TABLET | Refills: 1 | Status: SHIPPED | OUTPATIENT
Start: 2022-08-08 | End: 2022-10-06 | Stop reason: SDUPTHER

## 2022-08-08 RX ORDER — LABETALOL 200 MG/1
200 TABLET, FILM COATED ORAL 2 TIMES DAILY
Qty: 60 TABLET | Refills: 1 | Status: SHIPPED | OUTPATIENT
Start: 2022-08-08 | End: 2022-10-06 | Stop reason: SDUPTHER

## 2022-10-06 DIAGNOSIS — I10 ESSENTIAL HYPERTENSION: ICD-10-CM

## 2022-10-06 RX ORDER — CLONIDINE HYDROCHLORIDE 0.2 MG/1
0.2 TABLET ORAL
Qty: 30 TABLET | Refills: 1 | Status: SHIPPED | OUTPATIENT
Start: 2022-10-06 | End: 2022-12-09 | Stop reason: SDUPTHER

## 2022-10-06 RX ORDER — LABETALOL 200 MG/1
200 TABLET, FILM COATED ORAL 2 TIMES DAILY
Qty: 60 TABLET | Refills: 1 | Status: SHIPPED | OUTPATIENT
Start: 2022-10-06 | End: 2022-12-09 | Stop reason: SDUPTHER

## 2022-10-06 RX ORDER — HYDRALAZINE HYDROCHLORIDE 10 MG/1
10 TABLET, FILM COATED ORAL 2 TIMES DAILY
Qty: 60 TABLET | Refills: 1 | Status: SHIPPED | OUTPATIENT
Start: 2022-10-06 | End: 2022-12-09 | Stop reason: SDUPTHER

## 2022-12-09 DIAGNOSIS — I10 ESSENTIAL HYPERTENSION: ICD-10-CM

## 2022-12-09 RX ORDER — HYDRALAZINE HYDROCHLORIDE 10 MG/1
10 TABLET, FILM COATED ORAL 2 TIMES DAILY
Qty: 60 TABLET | Refills: 1 | Status: SHIPPED | OUTPATIENT
Start: 2022-12-09 | End: 2023-02-06 | Stop reason: SDUPTHER

## 2022-12-09 RX ORDER — LABETALOL 200 MG/1
200 TABLET, FILM COATED ORAL 2 TIMES DAILY
Qty: 60 TABLET | Refills: 1 | Status: SHIPPED | OUTPATIENT
Start: 2022-12-09 | End: 2023-02-06 | Stop reason: SDUPTHER

## 2022-12-09 RX ORDER — CLONIDINE HYDROCHLORIDE 0.2 MG/1
0.2 TABLET ORAL
Qty: 30 TABLET | Refills: 1 | Status: SHIPPED | OUTPATIENT
Start: 2022-12-09 | End: 2023-02-06 | Stop reason: SDUPTHER

## 2023-02-06 DIAGNOSIS — I10 ESSENTIAL HYPERTENSION: ICD-10-CM

## 2023-02-06 RX ORDER — CLONIDINE HYDROCHLORIDE 0.2 MG/1
0.2 TABLET ORAL
Qty: 30 TABLET | Refills: 1 | Status: SHIPPED | OUTPATIENT
Start: 2023-02-06

## 2023-02-06 RX ORDER — LABETALOL 200 MG/1
200 TABLET, FILM COATED ORAL 2 TIMES DAILY
Qty: 60 TABLET | Refills: 1 | Status: SHIPPED | OUTPATIENT
Start: 2023-02-06

## 2023-02-06 RX ORDER — HYDRALAZINE HYDROCHLORIDE 10 MG/1
10 TABLET, FILM COATED ORAL 2 TIMES DAILY
Qty: 60 TABLET | Refills: 1 | Status: SHIPPED | OUTPATIENT
Start: 2023-02-06

## 2023-03-17 ENCOUNTER — LAB (OUTPATIENT)
Dept: LAB | Facility: HOSPITAL | Age: 38
End: 2023-03-17
Payer: COMMERCIAL

## 2023-03-17 DIAGNOSIS — Z13.220 SCREENING FOR HYPERLIPIDEMIA: ICD-10-CM

## 2023-03-17 DIAGNOSIS — Z13.29 SCREENING FOR HYPOTHYROIDISM: ICD-10-CM

## 2023-03-17 DIAGNOSIS — I10 ESSENTIAL HYPERTENSION: ICD-10-CM

## 2023-03-17 DIAGNOSIS — Z13.1 SCREENING FOR DIABETES MELLITUS: ICD-10-CM

## 2023-03-17 LAB
ALBUMIN SERPL-MCNC: 3.8 G/DL (ref 3.5–5.2)
ALBUMIN/GLOB SERPL: 1.2 G/DL
ALP SERPL-CCNC: 96 U/L (ref 39–117)
ALT SERPL W P-5'-P-CCNC: 14 U/L (ref 1–33)
ANION GAP SERPL CALCULATED.3IONS-SCNC: 11 MMOL/L (ref 5–15)
ANISOCYTOSIS BLD QL: ABNORMAL
AST SERPL-CCNC: 17 U/L (ref 1–32)
BILIRUB SERPL-MCNC: 0.3 MG/DL (ref 0–1.2)
BUN SERPL-MCNC: 13 MG/DL (ref 6–20)
BUN/CREAT SERPL: 17.3 (ref 7–25)
CALCIUM SPEC-SCNC: 9.8 MG/DL (ref 8.6–10.5)
CHLORIDE SERPL-SCNC: 104 MMOL/L (ref 98–107)
CHOLEST SERPL-MCNC: 183 MG/DL (ref 0–200)
CO2 SERPL-SCNC: 23 MMOL/L (ref 22–29)
CREAT SERPL-MCNC: 0.75 MG/DL (ref 0.57–1)
DEPRECATED RDW RBC AUTO: 40.7 FL (ref 37–54)
EGFRCR SERPLBLD CKD-EPI 2021: 105.3 ML/MIN/1.73
ERYTHROCYTE [DISTWIDTH] IN BLOOD BY AUTOMATED COUNT: 17.5 % (ref 12.3–15.4)
GLOBULIN UR ELPH-MCNC: 3.2 GM/DL
GLUCOSE SERPL-MCNC: 78 MG/DL (ref 65–99)
HBA1C MFR BLD: 5.5 % (ref 4.8–5.6)
HCT VFR BLD AUTO: 28 % (ref 34–46.6)
HDLC SERPL-MCNC: 55 MG/DL (ref 40–60)
HGB BLD-MCNC: 8.2 G/DL (ref 12–15.9)
HYPOCHROMIA BLD QL: ABNORMAL
LDLC SERPL CALC-MCNC: 119 MG/DL (ref 0–100)
LDLC/HDLC SERPL: 2.16 {RATIO}
LYMPHOCYTES # BLD MANUAL: 1.1 10*3/MM3 (ref 0.7–3.1)
LYMPHOCYTES NFR BLD MANUAL: 9.1 % (ref 5–12)
MCH RBC QN AUTO: 19.3 PG (ref 26.6–33)
MCHC RBC AUTO-ENTMCNC: 29.3 G/DL (ref 31.5–35.7)
MCV RBC AUTO: 66 FL (ref 79–97)
MICROCYTES BLD QL: ABNORMAL
MONOCYTES # BLD: 0.77 10*3/MM3 (ref 0.1–0.9)
NEUTROPHILS # BLD AUTO: 6.54 10*3/MM3 (ref 1.7–7)
NEUTROPHILS NFR BLD MANUAL: 77.8 % (ref 42.7–76)
NRBC BLD AUTO-RTO: 0 /100 WBC (ref 0–0.2)
PLAT MORPH BLD: NORMAL
PLATELET # BLD AUTO: 432 10*3/MM3 (ref 140–450)
PMV BLD AUTO: 11.4 FL (ref 6–12)
POTASSIUM SERPL-SCNC: 4.5 MMOL/L (ref 3.5–5.2)
PROT SERPL-MCNC: 7 G/DL (ref 6–8.5)
RBC # BLD AUTO: 4.24 10*6/MM3 (ref 3.77–5.28)
SODIUM SERPL-SCNC: 138 MMOL/L (ref 136–145)
TRIGL SERPL-MCNC: 45 MG/DL (ref 0–150)
TSH SERPL DL<=0.05 MIU/L-ACNC: 2.33 UIU/ML (ref 0.27–4.2)
VARIANT LYMPHS NFR BLD MANUAL: 13.1 % (ref 19.6–45.3)
VLDLC SERPL-MCNC: 9 MG/DL (ref 5–40)
WBC MORPH BLD: NORMAL
WBC NRBC COR # BLD: 8.41 10*3/MM3 (ref 3.4–10.8)

## 2023-03-17 PROCEDURE — 85007 BL SMEAR W/DIFF WBC COUNT: CPT

## 2023-03-17 PROCEDURE — 80061 LIPID PANEL: CPT

## 2023-03-17 PROCEDURE — 83036 HEMOGLOBIN GLYCOSYLATED A1C: CPT

## 2023-03-17 PROCEDURE — 80050 GENERAL HEALTH PANEL: CPT

## 2023-03-21 ENCOUNTER — TELEPHONE (OUTPATIENT)
Dept: FAMILY MEDICINE CLINIC | Facility: CLINIC | Age: 38
End: 2023-03-21
Payer: COMMERCIAL

## 2023-03-21 DIAGNOSIS — D64.9 CHRONIC ANEMIA: Primary | ICD-10-CM

## 2023-03-21 DIAGNOSIS — D64.9 LOW HEMOGLOBIN AND LOW HEMATOCRIT: ICD-10-CM

## 2023-03-21 RX ORDER — LANOLIN ALCOHOL/MO/W.PET/CERES
325 CREAM (GRAM) TOPICAL
Qty: 90 TABLET | Refills: 3 | Status: SHIPPED | OUTPATIENT
Start: 2023-03-21

## 2023-03-21 NOTE — PROGRESS NOTES
Per JEWELS Hurley, Ms. Miles has been called with recent lab results & recommendations.  Continue current medications and follow-up as planned or sooner if any problems.     She states she has always had heavy periods, She said she does try to take her Iron at least daily but states she does not take it 3 times daily as prescribed.   Please send referral to Dr. LONI Lovell

## 2023-03-21 NOTE — TELEPHONE ENCOUNTER
Per JEWELS Hurley, Ms. Miles has been called with recent lab results & recommendations.  Continue current medications and follow-up as planned or sooner if any problems.     She states she has always had heavy periods, She said she does try to take her Iron at least daily but states she does not take it 3 times daily as prescribed.   Please send referral to Dr. LONI Lovell      ----- Message from JEWELS Sierra sent at 3/21/2023  1:45 PM CDT -----  She is profoundly anemic.  She has been prescribed ferrous sulfate which it appears she has not been taking.  I have sent in a prescription to the pharmacy and she will take 1 tablet 3 times daily with meals.  Is she having heavy menstrual cycles?  If so I need to refer her to an OB/GYN as soon as possible.  If not then I need to refer her to a hematologist as soon as possible.  She needs to increase iron rich foods in her diet.  All other labs were essentially normal.

## 2023-03-22 DIAGNOSIS — D64.9 CHRONIC ANEMIA: ICD-10-CM

## 2023-03-22 DIAGNOSIS — N92.0 MENORRHAGIA WITH REGULAR CYCLE: Primary | ICD-10-CM

## 2023-04-10 DIAGNOSIS — I10 ESSENTIAL HYPERTENSION: ICD-10-CM

## 2023-04-10 RX ORDER — LABETALOL 200 MG/1
200 TABLET, FILM COATED ORAL 2 TIMES DAILY
Qty: 60 TABLET | Refills: 1 | Status: SHIPPED | OUTPATIENT
Start: 2023-04-10

## 2023-04-10 RX ORDER — CLONIDINE HYDROCHLORIDE 0.2 MG/1
0.2 TABLET ORAL
Qty: 30 TABLET | Refills: 1 | Status: SHIPPED | OUTPATIENT
Start: 2023-04-10

## 2023-04-10 RX ORDER — HYDRALAZINE HYDROCHLORIDE 10 MG/1
10 TABLET, FILM COATED ORAL 2 TIMES DAILY
Qty: 60 TABLET | Refills: 1 | Status: SHIPPED | OUTPATIENT
Start: 2023-04-10

## 2023-05-08 ENCOUNTER — OFFICE VISIT (OUTPATIENT)
Dept: FAMILY MEDICINE CLINIC | Facility: CLINIC | Age: 38
End: 2023-05-08
Payer: COMMERCIAL

## 2023-05-08 ENCOUNTER — LAB (OUTPATIENT)
Dept: LAB | Facility: HOSPITAL | Age: 38
End: 2023-05-08
Payer: COMMERCIAL

## 2023-05-08 VITALS
WEIGHT: 293 LBS | OXYGEN SATURATION: 96 % | BODY MASS INDEX: 53.92 KG/M2 | HEIGHT: 62 IN | SYSTOLIC BLOOD PRESSURE: 142 MMHG | DIASTOLIC BLOOD PRESSURE: 86 MMHG | HEART RATE: 82 BPM

## 2023-05-08 DIAGNOSIS — Z00.00 ANNUAL PHYSICAL EXAM: Primary | ICD-10-CM

## 2023-05-08 DIAGNOSIS — D64.9 CHRONIC ANEMIA: ICD-10-CM

## 2023-05-08 DIAGNOSIS — E66.01 MORBID (SEVERE) OBESITY DUE TO EXCESS CALORIES: ICD-10-CM

## 2023-05-08 DIAGNOSIS — I10 ESSENTIAL HYPERTENSION: ICD-10-CM

## 2023-05-08 DIAGNOSIS — F41.9 ANXIETY: ICD-10-CM

## 2023-05-08 PROBLEM — T83.32XA IUD THREADS LOST: Status: RESOLVED | Noted: 2021-03-04 | Resolved: 2023-05-08

## 2023-05-08 PROBLEM — T83.32XA MALPOSITIONED IUD, INITIAL ENCOUNTER: Status: RESOLVED | Noted: 2021-03-04 | Resolved: 2023-05-08

## 2023-05-08 PROBLEM — Z01.419 WOMEN'S ANNUAL ROUTINE GYNECOLOGICAL EXAMINATION: Status: RESOLVED | Noted: 2021-03-04 | Resolved: 2023-05-08

## 2023-05-08 PROBLEM — J06.9 VIRAL UPPER RESPIRATORY TRACT INFECTION: Status: RESOLVED | Noted: 2018-02-05 | Resolved: 2023-05-08

## 2023-05-08 PROCEDURE — 85007 BL SMEAR W/DIFF WBC COUNT: CPT

## 2023-05-08 PROCEDURE — 82728 ASSAY OF FERRITIN: CPT

## 2023-05-08 PROCEDURE — 84466 ASSAY OF TRANSFERRIN: CPT

## 2023-05-08 PROCEDURE — 83540 ASSAY OF IRON: CPT

## 2023-05-08 PROCEDURE — 36415 COLL VENOUS BLD VENIPUNCTURE: CPT

## 2023-05-08 PROCEDURE — 82746 ASSAY OF FOLIC ACID SERUM: CPT

## 2023-05-08 PROCEDURE — 85025 COMPLETE CBC W/AUTO DIFF WBC: CPT

## 2023-05-08 PROCEDURE — 82607 VITAMIN B-12: CPT

## 2023-05-08 RX ORDER — BUSPIRONE HYDROCHLORIDE 15 MG/1
15 TABLET ORAL 3 TIMES DAILY
Qty: 270 TABLET | Refills: 3 | Status: SHIPPED | OUTPATIENT
Start: 2023-05-08

## 2023-05-08 RX ORDER — MELATONIN 10 MG
CAPSULE ORAL
COMMUNITY

## 2023-05-08 RX ORDER — TIRZEPATIDE 2.5 MG/.5ML
2.5 INJECTION, SOLUTION SUBCUTANEOUS WEEKLY
Qty: 2 ML | Refills: 1 | Status: SHIPPED | OUTPATIENT
Start: 2023-05-08 | End: 2023-05-08 | Stop reason: SDUPTHER

## 2023-05-08 RX ORDER — TIRZEPATIDE 2.5 MG/.5ML
2.5 INJECTION, SOLUTION SUBCUTANEOUS WEEKLY
Qty: 2 ML | Refills: 1 | Status: SHIPPED | OUTPATIENT
Start: 2023-05-08 | End: 2023-05-10

## 2023-05-08 NOTE — PROGRESS NOTES
Chief Complaint  Annual Exam    Subjective   Annual physical exam.  Has HTN, anxiety and obesity.  Anxiety has been increasing over the last month due to the recent passing of her grandfather and her oldest daughter for will be starting high school.        Do Miles presents to [facility] [place]  Hypertension  This is a chronic problem. The current episode started more than 1 year ago. The problem is unchanged. The problem is controlled. Associated symptoms include anxiety. Pertinent negatives include no blurred vision, chest pain or shortness of breath. There are no associated agents to hypertension. Risk factors for coronary artery disease include family history, obesity, sedentary lifestyle and stress. Current antihypertension treatment includes ACE inhibitors, beta blockers, central alpha agonists and calcium channel blockers. The current treatment provides moderate improvement. Compliance problems include exercise and diet.    Obesity  This is a chronic problem. The current episode started more than 1 year ago. The problem occurs constantly. The problem has been unchanged. Pertinent negatives include no chest pain, chills, coughing, diaphoresis, fatigue, fever or sore throat. The symptoms are aggravated by drinking and eating. Treatments tried: Low calorie diet, low carbohydrate diet, Belviq, Contrave, phentermine. The treatment provided no relief.   Anxiety  Presents for follow-up visit. Onset was 1 to 5 years ago. The problem has been gradually worsening. Symptoms include depressed mood, excessive worry, nervous/anxious behavior and restlessness. Patient reports no chest pain, confusion or shortness of breath. Symptoms occur most days. The severity of symptoms is moderate. The symptoms are aggravated by family issues. The quality of sleep is fair. Nighttime awakenings: occasional.     There are no known risk factors. Her past medical history is  "significant for anxiety/panic attacks. Past treatments include nothing. Compliance with medications is %.     Current Outpatient Medications on File Prior to Visit   Medication Sig Dispense Refill   • amLODIPine (Norvasc) 10 MG tablet Take 1 tablet by mouth Daily. 90 tablet 3   • buPROPion (WELLBUTRIN) 100 MG tablet Take 1 tablet by mouth Daily. 90 tablet 3   • cloNIDine (CATAPRES) 0.2 MG tablet Take 1 tablet by mouth every night at bedtime. 30 tablet 1   • ferrous sulfate 325 (65 FE) MG EC tablet Take 1 tablet by mouth 3 (Three) Times a Day With Meals. 90 tablet 3   • hydrALAZINE (APRESOLINE) 10 MG tablet Take 1 tablet by mouth 2 (Two) Times a Day. 60 tablet 1   • labetalol (NORMODYNE) 200 MG tablet Take 1 tablet by mouth 2 (Two) Times a Day. 60 tablet 1   • Melatonin 10 MG capsule Take  by mouth.     • [DISCONTINUED] busPIRone (BUSPAR) 10 MG tablet Take 1 tablet by mouth 3 (Three) Times a Day **Need to make appointment for more refills** 90 tablet 0     No current facility-administered medications on file prior to visit.     Allergies   Allergen Reactions   • Latex    • Adhesive Tape Hives       Objective   Vital Signs:  /86   Pulse 82   Ht 157.5 cm (62\")   Wt 134 kg (296 lb 6.4 oz)   SpO2 96%   BMI 54.21 kg/m²   Estimated body mass index is 54.21 kg/m² as calculated from the following:    Height as of this encounter: 157.5 cm (62\").    Weight as of this encounter: 134 kg (296 lb 6.4 oz).         Physical Exam  Vitals and nursing note reviewed.   Constitutional:       Appearance: She is well-developed.   HENT:      Head: Normocephalic.      Right Ear: External ear normal.      Left Ear: External ear normal.   Eyes:      Pupils: Pupils are equal, round, and reactive to light.   Cardiovascular:      Rate and Rhythm: Normal rate and regular rhythm.      Heart sounds: Normal heart sounds.   Pulmonary:      Effort: Pulmonary effort is normal.      Breath sounds: Normal breath sounds.   Abdominal:      " General: Bowel sounds are normal.      Palpations: Abdomen is soft.   Musculoskeletal:         General: Normal range of motion.      Cervical back: Normal range of motion and neck supple.   Skin:     General: Skin is warm.      Capillary Refill: Capillary refill takes less than 2 seconds.   Neurological:      Mental Status: She is alert and oriented to person, place, and time.   Psychiatric:         Behavior: Behavior normal.        Result Review :                   Assessment and Plan   Diagnoses and all orders for this visit:    1. Annual physical exam (Primary)    2. Essential hypertension    3. Anxiety  -     busPIRone (BUSPAR) 15 MG tablet; Take 1 tablet by mouth 3 (Three) Times a Day.  Dispense: 270 tablet; Refill: 3    4. Morbid (severe) obesity due to excess calories  -     Tirzepatide (Mounjaro) 2.5 MG/0.5ML solution pen-injector; Inject 2.5 mg (1 pen) under the skin into the appropriate area as directed 1 (One) Time Per Week.  Dispense: 2 mL; Refill: 1    5. Chronic anemia  -     CBC & Differential; Future    1.  Annual physical exam:  Continue on current medications as previously prescribed   Counseling on importance of heathy eating habits and regular physical activity regimen on improving overall physical and mental health.     2.  Essential hypertension:  Hypertensive  Continue amlodipine as previously prescribed  Continue hydralazine as previously prescribed  Continue labetalol as previously prescribed  Reduce sodium intake to no more than 1500 mg/day  Avoid adding extra salt to food before, during and after preparation has been completed  Target systolic BP is below 140    3.  Anxiety:  Increase BuSpar from 10 mg p.o. 3 times daily as needed to 15 mg p.o. 3 times daily.  Continue Wellbutrin as previously prescribed  Continue stress reducing techniques such as guided imagery and deep breathing    4.  Morbid obesity due to excess calories:  Body mass index is 54.21 kg/m².  Class 3 Severe Obesity (BMI  >=40). Obesity-related health conditions include the following: hypertension. Obesity is unchanged. BMI is is above average; BMI management plan is completed. We discussed portion control, increasing exercise and pharmacologic options including Mounjaro .  Begin Mounjaro as prescribed  Educated on possible adverse reactions of this medication including not limited to medullary thyroid cancer, endocrine neoplasia type II, pancreatitis and cholecystitis  Educated on common side effects of this medication including not limited to increased risk for gastrointestinal discomfort, nausea, indigestion, belching, diarrhea and/or constipation    5.  Chronic anemia,  Complete CBC as ordered will notify of results when available  Continue ferrous sulfate as previously prescribed       Follow Up   Return in about 3 months (around 8/8/2023) for Recheck obesity.  Patient was given instructions and counseling regarding her condition or for health maintenance advice. Please see specific information pulled into the AVS if appropriate.         This document has been electronically signed by JEWELS Sierra on May 8, 2023 15:31 CDT

## 2023-05-09 ENCOUNTER — TELEPHONE (OUTPATIENT)
Dept: FAMILY MEDICINE CLINIC | Facility: CLINIC | Age: 38
End: 2023-05-09
Payer: COMMERCIAL

## 2023-05-09 LAB
BASOPHILS # BLD MANUAL: 0.11 10*3/MM3 (ref 0–0.2)
BASOPHILS NFR BLD MANUAL: 1 % (ref 0–1.5)
DEPRECATED RDW RBC AUTO: 45.3 FL (ref 37–54)
EOSINOPHIL # BLD MANUAL: 0.45 10*3/MM3 (ref 0–0.4)
EOSINOPHIL NFR BLD MANUAL: 4.2 % (ref 0.3–6.2)
ERYTHROCYTE [DISTWIDTH] IN BLOOD BY AUTOMATED COUNT: 19.5 % (ref 12.3–15.4)
FERRITIN SERPL-MCNC: 9.89 NG/ML (ref 13–150)
FOLATE SERPL-MCNC: 14.5 NG/ML (ref 4.78–24.2)
HCT VFR BLD AUTO: 29.1 % (ref 34–46.6)
HGB BLD-MCNC: 8.7 G/DL (ref 12–15.9)
HYPOCHROMIA BLD QL: ABNORMAL
IRON 24H UR-MRATE: 86 MCG/DL (ref 37–145)
IRON SATN MFR SERPL: 19 % (ref 20–50)
LYMPHOCYTES # BLD MANUAL: 1.33 10*3/MM3 (ref 0.7–3.1)
LYMPHOCYTES NFR BLD MANUAL: 5.2 % (ref 5–12)
MCH RBC QN AUTO: 19.9 PG (ref 26.6–33)
MCHC RBC AUTO-ENTMCNC: 29.9 G/DL (ref 31.5–35.7)
MCV RBC AUTO: 66.4 FL (ref 79–97)
MICROCYTES BLD QL: ABNORMAL
MONOCYTES # BLD: 0.55 10*3/MM3 (ref 0.1–0.9)
NEUTROPHILS # BLD AUTO: 8.21 10*3/MM3 (ref 1.7–7)
NEUTROPHILS NFR BLD MANUAL: 77.1 % (ref 42.7–76)
PLAT MORPH BLD: NORMAL
PLATELET # BLD AUTO: 474 10*3/MM3 (ref 140–450)
PMV BLD AUTO: 11.7 FL (ref 6–12)
RBC # BLD AUTO: 4.38 10*6/MM3 (ref 3.77–5.28)
SCHISTOCYTES BLD QL SMEAR: ABNORMAL
SMUDGE CELLS BLD QL SMEAR: ABNORMAL
TIBC SERPL-MCNC: 454 MCG/DL (ref 298–536)
TRANSFERRIN SERPL-MCNC: 305 MG/DL (ref 200–360)
VARIANT LYMPHS NFR BLD MANUAL: 12.5 % (ref 19.6–45.3)
VIT B12 BLD-MCNC: 325 PG/ML (ref 211–946)
WBC NRBC COR # BLD: 10.65 10*3/MM3 (ref 3.4–10.8)

## 2023-05-09 NOTE — PROGRESS NOTES
Per JEWELS Hurley, Ms. Miles has been called with recent lab results & recommendations.  Continue current medications and follow-up as planned or sooner if any problems.     She is Seeing Dr. Lovell this week.

## 2023-05-09 NOTE — TELEPHONE ENCOUNTER
Per JEWELS Hurley, Ms. Miles has been called with recent lab results & recommendations.  Continue current medications and follow-up as planned or sooner if any problems.     She is Seeing Dr. Lovell this week.       ----- Message from JEWELS Sierra sent at 5/9/2023  7:08 AM CDT -----  Hemoglobin and hematocrit remain low but have had slight improvement.  Iron is now within normal limits.  Iron saturation low but has improved as well.  Continue iron supplement as previously prescribed.  Have her schedule follow-up with Dr. Lovell as discussed in office.  All other labs were essentially normal.

## 2023-05-10 DIAGNOSIS — E66.01 MORBID (SEVERE) OBESITY DUE TO EXCESS CALORIES: Primary | ICD-10-CM

## 2023-05-11 ENCOUNTER — OFFICE VISIT (OUTPATIENT)
Dept: OBSTETRICS AND GYNECOLOGY | Facility: CLINIC | Age: 38
End: 2023-05-11
Payer: COMMERCIAL

## 2023-05-11 VITALS
DIASTOLIC BLOOD PRESSURE: 90 MMHG | HEIGHT: 63 IN | SYSTOLIC BLOOD PRESSURE: 144 MMHG | WEIGHT: 293 LBS | BODY MASS INDEX: 51.91 KG/M2

## 2023-05-11 DIAGNOSIS — D50.0 IRON DEFICIENCY ANEMIA DUE TO CHRONIC BLOOD LOSS: ICD-10-CM

## 2023-05-11 DIAGNOSIS — N93.9 ABNORMAL UTERINE BLEEDING (AUB): Primary | ICD-10-CM

## 2023-05-11 RX ORDER — MEDROXYPROGESTERONE ACETATE 10 MG/1
10 TABLET ORAL DAILY
Qty: 30 TABLET | Refills: 6 | Status: SHIPPED | OUTPATIENT
Start: 2023-05-11

## 2023-05-12 NOTE — PROGRESS NOTES
Gynecology Consult  Date of Service: 2023  Referring provider: JEWELS Sierra    CC: Abnormal uterine bleeding    HPI  Do Miles is a 37 y.o.  premenopausal female who presents as a referral from JEWELS Milan secondary to abnormal uterine bleeding.    She states that her periods are getting worse and worse.  She states that she has always had heavy periods since menarche but have gotten worse over the past several years; she was considering a hysterectomy in .  She feels like her periods have been worsening with age and weight.  She states that she will pass large blood clots and have terrible menstrual cramps.  She is anemic, currently at 8.2; she is on iron supplements TID.  She has not been on IV iron.  In , she was so anemic that she required a blood transfusion.  In the past ~10 years, the highest her Hgb was 11.4 in 2018 but typically has stayed around Hgb 8 since 2015.      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    ROS  Review of Systems   Constitutional: Positive for fatigue.   HENT: Negative.    Eyes: Negative.    Respiratory: Negative.    Cardiovascular: Negative.    Gastrointestinal: Negative.    Endocrine: Negative.    Genitourinary: Positive for menstrual problem, pelvic pain and vaginal bleeding.   Musculoskeletal: Negative.    Skin: Negative.    Allergic/Immunologic: Negative.    Neurological: Negative.    Hematological: Negative.    Psychiatric/Behavioral: Negative.      GYN HISTORY  Menarche: age 11  Menses: Regular, every 28 days, lasts 4-5 days, +++ heavy, no intermenstrual bleeding  History of STIs: None  Last pap smear:   Abnormal pap smear history: None  Contraception: BTL     OB HISTORY  OB History    Para Term  AB Living   2 2 2     2   SAB IAB Ectopic Molar Multiple Live Births             2      #  Outcome Date GA Lbr Facundo/2nd Weight Sex Delivery Anes PTL Lv   2 Term 14     CS-Unspec   MICHAEL   1 Term 09     CS-Unspec   MICHAEL     PAST MEDICAL HISTORY  Past Medical History:   Diagnosis Date   • Allergic    • Anemia    • Anxiety    • Depression    • Heart murmur    • History of gestational diabetes    • History of transfusion 10/2015   • Hyperlipidemia    • Hypertension    • Migraine    • Obesity    • PMS (premenstrual syndrome)    • Varicella 1985     PAST SURGICAL HISTORY  Past Surgical History:   Procedure Laterality Date   • ANKLE SURGERY Left    •  SECTION  2009   •  SECTION WITH TUBAL  2014   • D & C HYSTEROSCOPY  2016     FAMILY HISTORY  Family History   Problem Relation Age of Onset   • No Known Problems Father    • Hypertension Mother    • Uterine cancer Mother    • No Known Problems Brother    • No Known Problems Sister    • No Known Problems Sister    • Eczema Daughter    • Eczema Daughter    • No Known Problems Paternal Grandfather    • No Known Problems Paternal Grandmother    • Hypertension Maternal Grandmother    • Hypertension Maternal Grandfather    • Diabetes Maternal Grandfather    • Breast cancer Neg Hx    • Ovarian cancer Neg Hx    • Colon cancer Neg Hx      SOCIAL HISTORY  Social History     Socioeconomic History   • Marital status:    Tobacco Use   • Smoking status: Never   • Smokeless tobacco: Never   Vaping Use   • Vaping Use: Never used   Substance and Sexual Activity   • Alcohol use: Not Currently     Comment: 2-3 drinks every few months   • Drug use: No   • Sexual activity: Yes     Partners: Male     Birth control/protection: Surgical     ALLERGIES  Allergies   Allergen Reactions   • Latex    • Adhesive Tape Hives     HOME MEDICATIONS  Prior to Admission medications    Medication Sig Start Date End Date Taking? Authorizing Provider   amLODIPine (Norvasc) 10 MG tablet Take 1 tablet by mouth Daily. 22  Yes Mei Gifford APRN   buPROPion  "(WELLBUTRIN) 100 MG tablet Take 1 tablet by mouth Daily. 6/1/22  Yes Mei Gifford APRN   busPIRone (BUSPAR) 15 MG tablet Take 1 tablet by mouth 3 (Three) Times a Day. 5/8/23  Yes Mei Gifford APRN   cloNIDine (CATAPRES) 0.2 MG tablet Take 1 tablet by mouth every night at bedtime. 4/10/23  Yes Mei Gifford APRN   ferrous sulfate 325 (65 FE) MG EC tablet Take 1 tablet by mouth 3 (Three) Times a Day With Meals. 3/21/23  Yes Mei Gifford APRN   hydrALAZINE (APRESOLINE) 10 MG tablet Take 1 tablet by mouth 2 (Two) Times a Day. 4/10/23  Yes Mei Gifford APRN   labetalol (NORMODYNE) 200 MG tablet Take 1 tablet by mouth 2 (Two) Times a Day. 4/10/23  Yes Mei Gifford APRN   Melatonin 10 MG capsule Take  by mouth.   Yes Provider, MD Logan   Semaglutide,0.25 or 0.5MG/DOS, (OZEMPIC) 2 MG/3ML solution pen-injector Inject 0.25 mg under the skin into the appropriate area as directed 1 (One) Time Per Week. 5/10/23  Yes Mei Gifford APRN   medroxyPROGESTERone (Provera) 10 MG tablet Take 1 tablet by mouth Daily. 5/11/23   Macy Lovell MD     PE  /90 (BP Location: Left arm, Patient Position: Sitting, Cuff Size: Large Adult)   Ht 160 cm (63\")   Wt 135 kg (297 lb)   LMP 04/21/2023 (Exact Date)   BMI 52.61 kg/m²        General: Alert, healthy, no distress, well nourished and well developed.  Neurologic: Alert, oriented to person, place, and time.  Gait normal.  Cranial nerves II-XII grossly intact.  HEENT: Normocephalic, atraumatic.  Extraocular muscles intact, pupils equal and reactive times two.    Neck: Supple, no adenopathy, thyroid normal size, non-tender, without nodularity, trachea midline.Lungs: Normal respiratory effort.  Clear to auscultation bilaterally.  No wheezes, rhonci, or rales.  Heart: Regular rate and rhythm.  No murmer, rub or gallop.  Abdomen: Soft, non-tender, non-distended,no masses, no hepatosplenomegaly, no hernia.  Skin: No rash, no lesions.  Extremities: No " cyanosis, clubbing or edema.  PELVIC EXAM: Deferred to EMB    IMPRESSION  Do Miles is a 37 y.o.  presenting with AUB, unknown etiology, with iron deficiency.  Will evaluate for structural etiologies and in the meantime, will start Provera to temporize bleeding.  RTC for TVUS/EMB+pap smear, schedule surgery; she was hoping for after school starts back.    PLAN    1. Abnormal uterine bleeding (AUB)  - US Non-ob Transvaginal; Future  - medroxyPROGESTERone (Provera) 10 MG tablet; Take 1 tablet by mouth Daily.  Dispense: 30 tablet; Refill: 6    2. Iron deficiency anemia due to chronic blood loss         Thank you for allowing me to participate in the care of this patient.  Please contact me with any questions or concerns.    This document has been electronically signed by Macy Lovell MD on May 12, 2023 17:13 CDT.    CC: JEWELS Sierra

## 2023-05-15 ENCOUNTER — PATIENT ROUNDING (BHMG ONLY) (OUTPATIENT)
Dept: OBSTETRICS AND GYNECOLOGY | Facility: CLINIC | Age: 38
End: 2023-05-15
Payer: COMMERCIAL

## 2023-05-15 NOTE — PROGRESS NOTES
May 15, 2023    Hello, may I speak with Do Miles?    My name is Tiera Malave CSA    [facility] am  [facility] [address]LO[address]  [facility] [id number] [phone].    Before we get started may I verify your date of birth? 1985    I am calling to officially welcome you to our practice and ask about your recent visit. Is this a good time to talk? No    Tell me about your visit with us. What things went well?         We're always looking for ways to make our patients' experiences even better. Do you have recommendations on ways we may improve?      Overall were you satisfied with your first visit to our practice?        I appreciate you taking the time to speak with me today. Is there anything else I can do for you?       Thank you, and have a great day.

## 2023-06-02 DIAGNOSIS — N93.9 ABNORMAL UTERINE BLEEDING (AUB): ICD-10-CM

## 2023-06-02 RX ORDER — MEDROXYPROGESTERONE ACETATE 10 MG/1
10 TABLET ORAL 2 TIMES DAILY
Qty: 60 TABLET | Refills: 6 | Status: SHIPPED | OUTPATIENT
Start: 2023-06-02

## 2023-06-08 DIAGNOSIS — I10 ESSENTIAL HYPERTENSION: ICD-10-CM

## 2023-06-08 RX ORDER — HYDRALAZINE HYDROCHLORIDE 10 MG/1
10 TABLET, FILM COATED ORAL 2 TIMES DAILY
Qty: 60 TABLET | Refills: 1 | Status: SHIPPED | OUTPATIENT
Start: 2023-06-08

## 2023-06-08 RX ORDER — CLONIDINE HYDROCHLORIDE 0.2 MG/1
0.2 TABLET ORAL
Qty: 30 TABLET | Refills: 1 | Status: SHIPPED | OUTPATIENT
Start: 2023-06-08

## 2023-06-08 RX ORDER — LABETALOL 200 MG/1
200 TABLET, FILM COATED ORAL 2 TIMES DAILY
Qty: 60 TABLET | Refills: 1 | Status: SHIPPED | OUTPATIENT
Start: 2023-06-08

## 2023-06-13 ENCOUNTER — PROCEDURE VISIT (OUTPATIENT)
Dept: OBSTETRICS AND GYNECOLOGY | Facility: CLINIC | Age: 38
End: 2023-06-13
Payer: COMMERCIAL

## 2023-06-13 VITALS
DIASTOLIC BLOOD PRESSURE: 88 MMHG | SYSTOLIC BLOOD PRESSURE: 150 MMHG | BODY MASS INDEX: 51.91 KG/M2 | HEIGHT: 63 IN | WEIGHT: 293 LBS

## 2023-06-13 DIAGNOSIS — N93.9 ABNORMAL UTERINE BLEEDING (AUB): Primary | ICD-10-CM

## 2023-06-13 DIAGNOSIS — Z12.4 ENCOUNTER FOR PAPANICOLAOU SMEAR FOR CERVICAL CANCER SCREENING: ICD-10-CM

## 2023-06-13 DIAGNOSIS — D50.0 IRON DEFICIENCY ANEMIA DUE TO CHRONIC BLOOD LOSS: ICD-10-CM

## 2023-06-13 LAB
B-HCG UR QL: NEGATIVE
EXPIRATION DATE: NORMAL
INTERNAL NEGATIVE CONTROL: NORMAL
INTERNAL POSITIVE CONTROL: NORMAL
Lab: NORMAL

## 2023-06-13 RX ORDER — MEDROXYPROGESTERONE ACETATE 10 MG/1
10 TABLET ORAL 3 TIMES DAILY
Qty: 90 TABLET | Refills: 6 | Status: SHIPPED | OUTPATIENT
Start: 2023-06-13

## 2023-06-13 NOTE — PROGRESS NOTES
[facility]  Gynecology   Date of Service: 2023     CC: EMB, US follow-up    HPI  Do Miles is a 37 y.o.  premenopausal female who presents for EMB.    She was seen last month as a JEWELS Milan secondary to abnormal uterine bleeding.  She states that her periods are getting worse and worse.  She states that she has always had heavy periods since menarche but have gotten worse over the past several years; she was considering a hysterectomy in .  She feels like her periods have been worsening with age and weight.  She states that she will pass large blood clots and have terrible menstrual cramps.  She is anemic, currently at 8.2; she is on iron supplements TID.  She has not been on IV iron.  In , she was so anemic that she required a blood transfusion.  In the past ~10 years, the highest her Hgb was 11.4 in 2018 but typically has stayed around Hgb 8 since 2015.      23 07:47  Hemoglobin A1C: 5.50  TSH Baseline: 2.330  Hemoglobin: 8.2 (L)    She was started on Provera at her last visit and scheduled for EMB.    She presents today for EMB and US follow-up.  She states that the Provera has helped with significantly with her bleeding but she does have daily spotting.      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    ROS  Review of Systems   Constitutional:  Positive for fatigue.   HENT: Negative.     Eyes: Negative.    Respiratory: Negative.     Cardiovascular: Negative.    Gastrointestinal: Negative.    Endocrine: Negative.    Genitourinary:  Positive for menstrual problem, pelvic pain and vaginal bleeding.   Musculoskeletal: Negative.    Skin: Negative.    Allergic/Immunologic: Negative.    Neurological: Negative.    Hematological: Negative.    Psychiatric/Behavioral: Negative.       GYN HISTORY  Menarche: age  11  Menses: Regular, every 28 days, lasts 4-5 days, +++ heavy, no intermenstrual bleeding  History of STIs: None  Last pap smear:   Abnormal pap smear history: None  Contraception: BTL     OB HISTORY  OB History    Para Term  AB Living   2 2 2     2   SAB IAB Ectopic Molar Multiple Live Births             2      # Outcome Date GA Lbr Facundo/2nd Weight Sex Delivery Anes PTL Lv   2 Term 14     CS-Unspec   MICHAEL   1 Term 09     CS-Unspec   MICHAEL     PAST MEDICAL HISTORY  Past Medical History:   Diagnosis Date    Allergic     Anemia     Anxiety     Depression     Heart murmur     History of gestational diabetes     History of transfusion 10/2015    Hyperlipidemia     Hypertension     Migraine     Obesity     PMS (premenstrual syndrome)     Varicella      PAST SURGICAL HISTORY  Past Surgical History:   Procedure Laterality Date    ANKLE SURGERY Left      SECTION  2009     SECTION WITH TUBAL  2014    D & C HYSTEROSCOPY  2016     FAMILY HISTORY  Family History   Problem Relation Age of Onset    No Known Problems Father     Hypertension Mother     Uterine cancer Mother     No Known Problems Brother     No Known Problems Sister     No Known Problems Sister     Eczema Daughter     Eczema Daughter     No Known Problems Paternal Grandfather     No Known Problems Paternal Grandmother     Hypertension Maternal Grandmother     Hypertension Maternal Grandfather     Diabetes Maternal Grandfather     Breast cancer Neg Hx     Ovarian cancer Neg Hx     Colon cancer Neg Hx      SOCIAL HISTORY  Social History     Socioeconomic History    Marital status:    Tobacco Use    Smoking status: Never    Smokeless tobacco: Never   Vaping Use    Vaping Use: Never used   Substance and Sexual Activity    Alcohol use: Not Currently     Comment: 2-3 drinks every few months    Drug use: No    Sexual activity: Yes     Partners: Male     Birth control/protection: Surgical  "    ALLERGIES  Allergies   Allergen Reactions    Latex     Adhesive Tape Hives     HOME MEDICATIONS  Prior to Admission medications    Medication Sig Start Date End Date Taking? Authorizing Provider   amLODIPine (Norvasc) 10 MG tablet Take 1 tablet by mouth Daily. 6/1/22  Yes Mei Gifford APRN   buPROPion (WELLBUTRIN) 100 MG tablet Take 1 tablet by mouth Daily. 6/1/22  Yes Mei Gifford APRN   busPIRone (BUSPAR) 15 MG tablet Take 1 tablet by mouth 3 (Three) Times a Day. 5/8/23  Yes Mei Gifford APRN   cloNIDine (CATAPRES) 0.2 MG tablet Take 1 tablet by mouth every night at bedtime. 4/10/23  Yes Mei Gifford APRN   ferrous sulfate 325 (65 FE) MG EC tablet Take 1 tablet by mouth 3 (Three) Times a Day With Meals. 3/21/23  Yes Mei Gifford APRN   hydrALAZINE (APRESOLINE) 10 MG tablet Take 1 tablet by mouth 2 (Two) Times a Day. 4/10/23  Yes Mei Gifford APRN   labetalol (NORMODYNE) 200 MG tablet Take 1 tablet by mouth 2 (Two) Times a Day. 4/10/23  Yes Mei Gifford APRN   Melatonin 10 MG capsule Take  by mouth.   Yes Provider, MD Logan   Semaglutide,0.25 or 0.5MG/DOS, (OZEMPIC) 2 MG/3ML solution pen-injector Inject 0.25 mg under the skin into the appropriate area as directed 1 (One) Time Per Week. 5/10/23  Yes Mei Gifford APRN   medroxyPROGESTERone (Provera) 10 MG tablet Take 1 tablet by mouth Daily. 5/11/23   Macy oLvell MD     PE  /88 (BP Location: Left arm, Patient Position: Sitting, Cuff Size: Adult)   Ht 160 cm (63\")   Wt 136 kg (299 lb)   LMP 05/22/2023 (Approximate)   BMI 52.97 kg/m²        General: Alert, healthy, no distress, well nourished and well developed.  Neurologic: Alert, oriented to person, place, and time.  Gait normal.  Cranial nerves II-XII grossly intact.  HEENT: Normocephalic, atraumatic.  Extraocular muscles intact, pupils equal and reactive times two.    Neck: Supple, no adenopathy, thyroid normal size, non-tender, without nodularity, trachea " midline.Lungs: Normal respiratory effort.  Clear to auscultation bilaterally.  No wheezes, rhonci, or rales.  Heart: Regular rate and rhythm.  No murmer, rub or gallop.  Abdomen: Soft, non-tender, non-distended,no masses, no hepatosplenomegaly, no hernia.  Skin: No rash, no lesions.  Extremities: No cyanosis, clubbing or edema.  External Genitalia/Vulva: Anatomy is normal, no significant redness of labia, no discharge on vulvar tissues, Erda's and Bartholin's glands are normal, no ulcers, no condylomatous lesions.  Urethral meatus: Normal, no lesions, no prolapse.  Urethra: Normal, no masses, no tenderness with palpation.  Bladder: Normal, no fullness, no masses, no tenderness with palpation.  Vagina: Vaginal tissues are not inflamed, normal color and texture, no significant discharge present.  Pelvic support adequate.  Cervix: Normal, no lesions, no purulent discharge, no cervical motion tenderness.  Pap smear obtained.  Uterus: Normal size, shape, and consistency.  Good mobility noted.  Minimal descent noted with good support.  Adnexa: Normal size and shape bilaterally, no palpable mass bilaterally and non-tender bilaterally.  Rectal: Normal, no masses or polyps, confirms bimanual exam, perianal normal, no lesions; ISAIAS deferred.    See procedure note for EMB    IMPRESSION  Do Miles is a 37 y.o.  presenting with AUB, I suspect AUB-A (adenomyosis), with iron deficiency.  Discussed that I do not recommend an ablation based on the size of her uterus which I feel will cause the ablation to fail as well as her obesity which puts her at risk for hyperplasia.  Discussed recommendation for hysterectomy; patient agreeable.  Will obtain EMB, pap smear to day and will schedule once resulted.  Increase Provera to TID in the interim to help with spotting.    PLAN    1. Abnormal uterine bleeding (AUB)  - POC Pregnancy, Urine  - medroxyPROGESTERone (Provera) 10 MG tablet; Take 1 tablet by mouth 3 (Three) Times a  Day.  Dispense: 90 tablet; Refill: 6  - TISSUE EXAM, [facility] ([facility]); Future  - TISSUE EXAM, [facility] ([facility])    2. Iron deficiency anemia due to chronic blood loss    3. Encounter for Papanicolaou smear for cervical cancer screening  - LIQUID-BASED PAP SMEAR WITH HPV GENOTYPING REGARDLESS OF INTERPRETATION ([facility]); Future  - LIQUID-BASED PAP SMEAR WITH HPV GENOTYPING REGARDLESS OF INTERPRETATION ([facility])    This document has been electronically signed by Macy Lovell MD on June 13, 2023 15:10 CDT.

## 2023-06-13 NOTE — PROGRESS NOTES
[facility]  Gynecology  Procedure Note: Endometrial Biopsy    Date of procedure: 6/13/2023    LMP: Patient's last menstrual period was 05/22/2023 (approximate).  UPT: negative    Procedure documentation:    Speculum was inserted and the cervix was visualized.  The cervix was cleansed with Betadine.  The cervix was grasped anterior at the 12 o'clock position.  The cavity sounded to 10 centimeters.  An endometrial biopsy specimen was obtained; old blood and tissue was obtained.  A second pass was made with the same result.  The tissue was sent for permanent histopathologic evaluation.  Tenaculum was removed from the cervix with scant bleeding.  She tolerated the procedure well.    Post procedure instructions: If there is any significant fever or excessive bleeding or pain she is to call immediately.    Macy Lovell MD  6/13/2023  15:09 CDT

## 2023-06-14 ENCOUNTER — PREP FOR SURGERY (OUTPATIENT)
Dept: OBSTETRICS AND GYNECOLOGY | Facility: CLINIC | Age: 38
End: 2023-06-14
Payer: COMMERCIAL

## 2023-06-14 DIAGNOSIS — N93.9 ABNORMAL UTERINE BLEEDING (AUB): Primary | ICD-10-CM

## 2023-06-14 RX ORDER — ACETAMINOPHEN 500 MG
1000 TABLET ORAL ONCE
OUTPATIENT
Start: 2023-06-14 | End: 2023-06-14

## 2023-06-14 RX ORDER — SODIUM CHLORIDE 0.9 % (FLUSH) 0.9 %
3 SYRINGE (ML) INJECTION EVERY 12 HOURS SCHEDULED
OUTPATIENT
Start: 2023-06-14

## 2023-06-14 RX ORDER — SCOLOPAMINE TRANSDERMAL SYSTEM 1 MG/1
1 PATCH, EXTENDED RELEASE TRANSDERMAL CONTINUOUS
OUTPATIENT
Start: 2023-06-14 | End: 2023-06-17

## 2023-06-14 RX ORDER — SODIUM CHLORIDE, SODIUM LACTATE, POTASSIUM CHLORIDE, CALCIUM CHLORIDE 600; 310; 30; 20 MG/100ML; MG/100ML; MG/100ML; MG/100ML
125 INJECTION, SOLUTION INTRAVENOUS CONTINUOUS
OUTPATIENT
Start: 2023-06-14

## 2023-06-14 RX ORDER — SODIUM CHLORIDE 9 MG/ML
40 INJECTION, SOLUTION INTRAVENOUS AS NEEDED
OUTPATIENT
Start: 2023-06-14

## 2023-06-14 RX ORDER — GABAPENTIN 100 MG/1
600 CAPSULE ORAL ONCE
OUTPATIENT
Start: 2023-06-14 | End: 2023-06-14

## 2023-06-14 RX ORDER — SODIUM CHLORIDE 0.9 % (FLUSH) 0.9 %
10 SYRINGE (ML) INJECTION AS NEEDED
OUTPATIENT
Start: 2023-06-14

## 2023-06-15 PROBLEM — N93.9 ABNORMAL UTERINE BLEEDING (AUB): Status: ACTIVE | Noted: 2023-06-15

## 2023-06-15 LAB
REF LAB TEST METHOD: NORMAL
REF LAB TEST METHOD: NORMAL

## 2023-08-07 DIAGNOSIS — F41.9 ANXIETY: ICD-10-CM

## 2023-08-07 DIAGNOSIS — I10 ESSENTIAL HYPERTENSION: ICD-10-CM

## 2023-08-07 RX ORDER — HYDRALAZINE HYDROCHLORIDE 10 MG/1
10 TABLET, FILM COATED ORAL 2 TIMES DAILY
Qty: 60 TABLET | Refills: 1 | Status: SHIPPED | OUTPATIENT
Start: 2023-08-07

## 2023-08-07 RX ORDER — BUPROPION HYDROCHLORIDE 100 MG/1
100 TABLET ORAL DAILY
Qty: 90 TABLET | Refills: 3 | Status: SHIPPED | OUTPATIENT
Start: 2023-08-07

## 2023-08-07 RX ORDER — AMLODIPINE BESYLATE 10 MG/1
10 TABLET ORAL DAILY
Qty: 90 TABLET | Refills: 3 | Status: SHIPPED | OUTPATIENT
Start: 2023-08-07

## 2023-08-07 RX ORDER — LABETALOL 200 MG/1
200 TABLET, FILM COATED ORAL 2 TIMES DAILY
Qty: 60 TABLET | Refills: 1 | Status: SHIPPED | OUTPATIENT
Start: 2023-08-07

## 2023-08-07 RX ORDER — CLONIDINE HYDROCHLORIDE 0.2 MG/1
0.2 TABLET ORAL
Qty: 30 TABLET | Refills: 1 | Status: SHIPPED | OUTPATIENT
Start: 2023-08-07

## 2023-08-23 ENCOUNTER — TELEPHONE (OUTPATIENT)
Dept: PULMONOLOGY | Facility: CLINIC | Age: 38
End: 2023-08-23
Payer: COMMERCIAL

## 2023-09-05 ENCOUNTER — ANESTHESIA EVENT (OUTPATIENT)
Dept: EMERGENCY DEPT | Facility: HOSPITAL | Age: 38
End: 2023-09-05

## 2023-09-05 ENCOUNTER — PRE-ADMISSION TESTING (OUTPATIENT)
Dept: PREADMISSION TESTING | Facility: HOSPITAL | Age: 38
End: 2023-09-05
Payer: COMMERCIAL

## 2023-09-05 ENCOUNTER — OFFICE VISIT (OUTPATIENT)
Dept: OBSTETRICS AND GYNECOLOGY | Facility: CLINIC | Age: 38
End: 2023-09-05
Payer: COMMERCIAL

## 2023-09-05 VITALS
HEIGHT: 63 IN | DIASTOLIC BLOOD PRESSURE: 82 MMHG | SYSTOLIC BLOOD PRESSURE: 134 MMHG | BODY MASS INDEX: 51.91 KG/M2 | WEIGHT: 293 LBS

## 2023-09-05 DIAGNOSIS — N93.9 ABNORMAL UTERINE BLEEDING (AUB): ICD-10-CM

## 2023-09-05 DIAGNOSIS — D50.0 IRON DEFICIENCY ANEMIA DUE TO CHRONIC BLOOD LOSS: ICD-10-CM

## 2023-09-05 DIAGNOSIS — Z01.818 PRE-OP EVALUATION: Primary | ICD-10-CM

## 2023-09-05 LAB
ABO GROUP BLD: NORMAL
ANION GAP SERPL CALCULATED.3IONS-SCNC: 9 MMOL/L (ref 5–15)
ANISOCYTOSIS BLD QL: NORMAL
BASOPHILS # BLD AUTO: 0.09 10*3/MM3 (ref 0–0.2)
BASOPHILS NFR BLD AUTO: 0.8 % (ref 0–1.5)
BLD GP AB SCN SERPL QL: NEGATIVE
BUN SERPL-MCNC: 15 MG/DL (ref 6–20)
BUN/CREAT SERPL: 19 (ref 7–25)
CALCIUM SPEC-SCNC: 10 MG/DL (ref 8.6–10.5)
CHLORIDE SERPL-SCNC: 105 MMOL/L (ref 98–107)
CO2 SERPL-SCNC: 24 MMOL/L (ref 22–29)
CREAT SERPL-MCNC: 0.79 MG/DL (ref 0.57–1)
DACRYOCYTES BLD QL SMEAR: NORMAL
DEPRECATED RDW RBC AUTO: 46.3 FL (ref 37–54)
EGFRCR SERPLBLD CKD-EPI 2021: 98.3 ML/MIN/1.73
EOSINOPHIL # BLD AUTO: 0.28 10*3/MM3 (ref 0–0.4)
EOSINOPHIL NFR BLD AUTO: 2.6 % (ref 0.3–6.2)
ERYTHROCYTE [DISTWIDTH] IN BLOOD BY AUTOMATED COUNT: 18.9 % (ref 12.3–15.4)
GLUCOSE SERPL-MCNC: 89 MG/DL (ref 65–99)
HCT VFR BLD AUTO: 32.4 % (ref 34–46.6)
HGB BLD-MCNC: 9.5 G/DL (ref 12–15.9)
HYPOCHROMIA BLD QL: NORMAL
IMM GRANULOCYTES # BLD AUTO: 0.02 10*3/MM3 (ref 0–0.05)
IMM GRANULOCYTES NFR BLD AUTO: 0.2 % (ref 0–0.5)
LYMPHOCYTES # BLD AUTO: 1.39 10*3/MM3 (ref 0.7–3.1)
LYMPHOCYTES NFR BLD AUTO: 13.1 % (ref 19.6–45.3)
Lab: NORMAL
MCH RBC QN AUTO: 20.3 PG (ref 26.6–33)
MCHC RBC AUTO-ENTMCNC: 29.3 G/DL (ref 31.5–35.7)
MCV RBC AUTO: 69.1 FL (ref 79–97)
MICROCYTES BLD QL: NORMAL
MONOCYTES # BLD AUTO: 0.81 10*3/MM3 (ref 0.1–0.9)
MONOCYTES NFR BLD AUTO: 7.6 % (ref 5–12)
NEUTROPHILS NFR BLD AUTO: 75.7 % (ref 42.7–76)
NEUTROPHILS NFR BLD AUTO: 8.06 10*3/MM3 (ref 1.7–7)
NRBC BLD AUTO-RTO: 0 /100 WBC (ref 0–0.2)
PLATELET # BLD AUTO: 461 10*3/MM3 (ref 140–450)
PMV BLD AUTO: 9.7 FL (ref 6–12)
POIKILOCYTOSIS BLD QL SMEAR: NORMAL
POTASSIUM SERPL-SCNC: 4.2 MMOL/L (ref 3.5–5.2)
RBC # BLD AUTO: 4.69 10*6/MM3 (ref 3.77–5.28)
RH BLD: POSITIVE
SMALL PLATELETS BLD QL SMEAR: NORMAL
SODIUM SERPL-SCNC: 138 MMOL/L (ref 136–145)
T&S EXPIRATION DATE: NORMAL
TARGETS BLD QL SMEAR: NORMAL
TOXIC GRANULATION: NORMAL
WBC NRBC COR # BLD: 10.65 10*3/MM3 (ref 3.4–10.8)

## 2023-09-05 PROCEDURE — 85007 BL SMEAR W/DIFF WBC COUNT: CPT

## 2023-09-05 PROCEDURE — 85025 COMPLETE CBC W/AUTO DIFF WBC: CPT

## 2023-09-05 PROCEDURE — 86901 BLOOD TYPING SEROLOGIC RH(D): CPT

## 2023-09-05 PROCEDURE — 80048 BASIC METABOLIC PNL TOTAL CA: CPT

## 2023-09-05 PROCEDURE — 93005 ELECTROCARDIOGRAM TRACING: CPT

## 2023-09-05 PROCEDURE — 86850 RBC ANTIBODY SCREEN: CPT

## 2023-09-05 PROCEDURE — 86900 BLOOD TYPING SEROLOGIC ABO: CPT

## 2023-09-05 PROCEDURE — 36415 COLL VENOUS BLD VENIPUNCTURE: CPT

## 2023-09-05 PROCEDURE — 99214 OFFICE O/P EST MOD 30 MIN: CPT | Performed by: OBSTETRICS & GYNECOLOGY

## 2023-09-05 NOTE — PROGRESS NOTES
[facility]  Gynecology   Date of Service: 2023     CC: Pre-op visit    HPI  Do Miles is a 38 y.o.  premenopausal female who presents for pre-op visit.    She was seen in May 2023 as a referral from JEWELS [place] secondary to abnormal uterine bleeding.  She states that her periods are getting worse and worse.  She states that she has always had heavy periods since menarche but have gotten worse over the past several years; she was considering a hysterectomy in .  She feels like her periods have been worsening with age and weight.  She states that she will pass large blood clots and have terrible menstrual cramps.  She is anemic, currently at 8.2; she is on iron supplements TID.  She has not been on IV iron.  In , she was so anemic that she required a blood transfusion.  In the past ~10 years, the highest her Hgb was 11.4 in 2018 but typically has stayed around Hgb 8 since 2015.  She was started on Provera, had lab work done, and was scheduled for an US and EMB.    23 07:47  Hemoglobin A1C: 5.50  TSH Baseline: 2.330  Hemoglobin: 8.2 (L)    She presented in 2023 for EMB and US follow-up.  She states that the Provera has helped with significantly with her bleeding but she does have daily spotting.      TVUS:   Uterus 10.25 x 5.83 x 6.84 cm, heterogenous, EMS 1.39 cm  R ovary not visualized  L ovary 2.52 x 1.88 x 2.63 cm    She presents today for pre-op visit.  No concerns.    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    ROS  Review of Systems   Constitutional:  Positive for fatigue.   HENT: Negative.     Eyes: Negative.    Respiratory: Negative.     Cardiovascular: Negative.    Gastrointestinal: Negative.    Endocrine: Negative.    Genitourinary:  Positive for menstrual problem, pelvic pain and vaginal bleeding.   Musculoskeletal: Negative.    Skin: Negative.    Allergic/Immunologic: Negative.    Neurological: Negative.    Hematological:  Negative.    Psychiatric/Behavioral: Negative.     GYN HISTORY  Menarche: age 11  Menses: Regular, every 28 days, lasts 4-5 days, +++ heavy, no intermenstrual bleeding  History of STIs: None  Last pap smear:   Abnormal pap smear history: None  Contraception: BTL     OB HISTORY  OB History    Para Term  AB Living   2 2 2     2   SAB IAB Ectopic Molar Multiple Live Births             2      # Outcome Date GA Lbr Facundo/2nd Weight Sex Delivery Anes PTL Lv   2 Term 14     CS-Unspec   MICHAEL   1 Term 09     CS-Unspec   MICHAEL     PAST MEDICAL HISTORY  Past Medical History:   Diagnosis Date    Allergic     Anemia     Anxiety     Depression     Heart murmur     History of gestational diabetes     History of transfusion 10/2015    Hyperlipidemia     Hypertension     Migraine     Obesity     PMS (premenstrual syndrome)     Varicella 1985     PAST SURGICAL HISTORY  Past Surgical History:   Procedure Laterality Date    ANKLE SURGERY Left      SECTION  2009     SECTION WITH TUBAL  2014    D & C HYSTEROSCOPY  2016     FAMILY HISTORY  Family History   Problem Relation Age of Onset    No Known Problems Father     Hypertension Mother     Uterine cancer Mother     No Known Problems Brother     No Known Problems Sister     No Known Problems Sister     Eczema Daughter     Eczema Daughter     No Known Problems Paternal Grandfather     No Known Problems Paternal Grandmother     Hypertension Maternal Grandmother     Hypertension Maternal Grandfather     Diabetes Maternal Grandfather     Breast cancer Neg Hx     Ovarian cancer Neg Hx     Colon cancer Neg Hx      SOCIAL HISTORY  Social History     Socioeconomic History    Marital status:    Tobacco Use    Smoking status: Never    Smokeless tobacco: Never   Vaping Use    Vaping Use: Never used   Substance and Sexual Activity    Alcohol use: Not Currently     Comment: 2-3 drinks every few months    Drug use: No    Sexual activity:  "Yes     Partners: Male     Birth control/protection: Surgical     ALLERGIES  Allergies   Allergen Reactions    Latex     Adhesive Tape Hives     HOME MEDICATIONS  Prior to Admission medications    Medication Sig Start Date End Date Taking? Authorizing Provider   amLODIPine (Norvasc) 10 MG tablet Take 1 tablet by mouth Daily. 6/1/22  Yes Mei Gifford APRN   buPROPion (WELLBUTRIN) 100 MG tablet Take 1 tablet by mouth Daily. 6/1/22  Yes Mei Gifford APRN   busPIRone (BUSPAR) 15 MG tablet Take 1 tablet by mouth 3 (Three) Times a Day. 5/8/23  Yes Mei Gifford APRN   cloNIDine (CATAPRES) 0.2 MG tablet Take 1 tablet by mouth every night at bedtime. 4/10/23  Yes Mei Gifford APRN   ferrous sulfate 325 (65 FE) MG EC tablet Take 1 tablet by mouth 3 (Three) Times a Day With Meals. 3/21/23  Yes Mei Gifford APRN   hydrALAZINE (APRESOLINE) 10 MG tablet Take 1 tablet by mouth 2 (Two) Times a Day. 4/10/23  Yes Mei Gifford APRN   labetalol (NORMODYNE) 200 MG tablet Take 1 tablet by mouth 2 (Two) Times a Day. 4/10/23  Yes Mei Gifford APRN   Melatonin 10 MG capsule Take  by mouth.   Yes Provider, MD Logan   Semaglutide,0.25 or 0.5MG/DOS, (OZEMPIC) 2 MG/3ML solution pen-injector Inject 0.25 mg under the skin into the appropriate area as directed 1 (One) Time Per Week. 5/10/23  Yes Mei Gifford APRN   medroxyPROGESTERone (Provera) 10 MG tablet Take 1 tablet by mouth Daily. 5/11/23   Macy Lovell MD     PE  /82 (BP Location: Left arm, Patient Position: Sitting, Cuff Size: Adult)   Ht 160 cm (63\")   Wt (!) 138 kg (303 lb 3.2 oz)   BMI 53.71 kg/m²        General: Alert, healthy, no distress, well nourished and well developed.  Neurologic: Alert, oriented to person, place, and time.  Gait normal.  Cranial nerves II-XII grossly intact.  HEENT: Normocephalic, atraumatic.  Extraocular muscles intact, pupils equal and reactive times two.    Neck: Supple, no adenopathy, thyroid normal size, " non-tender, without nodularity, trachea midline.Lungs: Normal respiratory effort.  Clear to auscultation bilaterally.  No wheezes, rhonci, or rales.  Heart: Regular rate and rhythm.  No murmer, rub or gallop.  Abdomen: Soft, non-tender, non-distended,no masses, no hepatosplenomegaly, no hernia.  Skin: No rash, no lesions.  Extremities: No cyanosis, clubbing or edema.    IMPRESSION  Do Miles is a 38 y.o.  presenting with AUB, I suspect AUB-A (adenomyosis), with iron deficiency, failed medical management.  Discussed that I do not recommend an ablation based on the size of her uterus which I feel will cause the ablation to fail due to suspected adenomyosis as well as her obesity which puts her at risk for hyperplasia.  Proceed with total laparoscopic hysterectomy, bilateral salpingectomy, possible oophorectomy, possible exploratory laparotomy, cystoscopy.  Discussed R/B/A.  Discussed risks including injury to surrounding organ (bowel, bladder, ureters, blood vessels, nerves), infection, bleeding (possibly requiring blood transfusion), heart attack, stroke, and death.  Discussed possible need for conversion to open procedure.  Will give pre-op ABX (Ancef 3 g).  Discussed pro/cons of ovarian preservation.  Discussed that surgical menopause increases the risk of osteoporosis and early heart disease if untreated.  Discussed that if she chooses HRT, there is a decreased risk of colon cancer, decrease in osteoporosis, slight increase in breast cancer risk, and slight increase risk of DVT/clot per Women's Health Initiative data.  Patient desires ovarian preservation.  Patient voices understanding and is agreeable.    PLAN    1. Pre-op evaluation    2. Abnormal uterine bleeding (AUB)    3. Iron deficiency anemia due to chronic blood loss    This document has been electronically signed by Macy Lovell MD on 2023 13:33 CDT.

## 2023-09-05 NOTE — H&P (VIEW-ONLY)
[facility]  Gynecology   Date of Service: 2023     CC: Pre-op visit    HPI  Do Miles is a 38 y.o.  premenopausal female who presents for pre-op visit.    She was seen in May 2023 as a referral from [facility] secondary to abnormal uterine bleeding.  She states that her periods are getting worse and worse.  She states that she has always had heavy periods since menarche but have gotten worse over the past several years; she was considering a hysterectomy in .  She feels like her periods have been worsening with age and weight.  She states that she will pass large blood clots and have terrible menstrual cramps.  She is anemic, currently at 8.2; she is on iron supplements TID.  She has not been on IV iron.  In , she was so anemic that she required a blood transfusion.  In the past ~10 years, the highest her Hgb was 11.4 in 2018 but typically has stayed around Hgb 8 since 2015.  She was started on Provera, had lab work done, and was scheduled for an US and EMB.    23 07:47  Hemoglobin A1C: 5.50  TSH Baseline: 2.330  Hemoglobin: 8.2 (L)    She presented in 2023 for EMB and US follow-up.  She states that the Provera has helped with significantly with her bleeding but she does have daily spotting.      TVUS:   Uterus 10.25 x 5.83 x 6.84 cm, heterogenous, EMS 1.39 cm  R ovary not visualized  L ovary 2.52 x 1.88 x 2.63 cm    She presents today for pre-op visit.  No concerns.    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    ROS  Review of Systems   Constitutional:  Positive for fatigue.   HENT: Negative.     Eyes: Negative.    Respiratory: Negative.     Cardiovascular: Negative.    Gastrointestinal: Negative.    Endocrine: Negative.    Genitourinary:  Positive for menstrual problem, pelvic pain and vaginal bleeding.   Musculoskeletal: Negative.    Skin: Negative.    Allergic/Immunologic: Negative.    Neurological: Negative.    Hematological:  Negative.    Psychiatric/Behavioral: Negative.     GYN HISTORY  Menarche: age 11  Menses: Regular, every 28 days, lasts 4-5 days, +++ heavy, no intermenstrual bleeding  History of STIs: None  Last pap smear:   Abnormal pap smear history: None  Contraception: BTL     OB HISTORY  OB History    Para Term  AB Living   2 2 2     2   SAB IAB Ectopic Molar Multiple Live Births             2      # Outcome Date GA Lbr Facundo/2nd Weight Sex Delivery Anes PTL Lv   2 Term 14     CS-Unspec   MICHAEL   1 Term 09     CS-Unspec   MICHAEL     PAST MEDICAL HISTORY  Past Medical History:   Diagnosis Date    Allergic     Anemia     Anxiety     Depression     Heart murmur     History of gestational diabetes     History of transfusion 10/2015    Hyperlipidemia     Hypertension     Migraine     Obesity     PMS (premenstrual syndrome)     Varicella 1985     PAST SURGICAL HISTORY  Past Surgical History:   Procedure Laterality Date    ANKLE SURGERY Left      SECTION  2009     SECTION WITH TUBAL  2014    D & C HYSTEROSCOPY  2016     FAMILY HISTORY  Family History   Problem Relation Age of Onset    No Known Problems Father     Hypertension Mother     Uterine cancer Mother     No Known Problems Brother     No Known Problems Sister     No Known Problems Sister     Eczema Daughter     Eczema Daughter     No Known Problems Paternal Grandfather     No Known Problems Paternal Grandmother     Hypertension Maternal Grandmother     Hypertension Maternal Grandfather     Diabetes Maternal Grandfather     Breast cancer Neg Hx     Ovarian cancer Neg Hx     Colon cancer Neg Hx      SOCIAL HISTORY  Social History     Socioeconomic History    Marital status:    Tobacco Use    Smoking status: Never    Smokeless tobacco: Never   Vaping Use    Vaping Use: Never used   Substance and Sexual Activity    Alcohol use: Not Currently     Comment: 2-3 drinks every few months    Drug use: No    Sexual activity:  "Yes     Partners: Male     Birth control/protection: Surgical     ALLERGIES  Allergies   Allergen Reactions    Latex     Adhesive Tape Hives     HOME MEDICATIONS  Prior to Admission medications    Medication Sig Start Date End Date Taking? Authorizing Provider   amLODIPine (Norvasc) 10 MG tablet Take 1 tablet by mouth Daily. 6/1/22  Yes Mei Gifford APRN   buPROPion (WELLBUTRIN) 100 MG tablet Take 1 tablet by mouth Daily. 6/1/22  Yes Mei Gifford APRN   busPIRone (BUSPAR) 15 MG tablet Take 1 tablet by mouth 3 (Three) Times a Day. 5/8/23  Yes Mei Gifford APRN   cloNIDine (CATAPRES) 0.2 MG tablet Take 1 tablet by mouth every night at bedtime. 4/10/23  Yes Mei Gifford APRN   ferrous sulfate 325 (65 FE) MG EC tablet Take 1 tablet by mouth 3 (Three) Times a Day With Meals. 3/21/23  Yes Mei Gifford APRN   hydrALAZINE (APRESOLINE) 10 MG tablet Take 1 tablet by mouth 2 (Two) Times a Day. 4/10/23  Yes Mei Gifford APRN   labetalol (NORMODYNE) 200 MG tablet Take 1 tablet by mouth 2 (Two) Times a Day. 4/10/23  Yes Mei Gifford APRN   Melatonin 10 MG capsule Take  by mouth.   Yes Provider, MD Logan   Semaglutide,0.25 or 0.5MG/DOS, (OZEMPIC) 2 MG/3ML solution pen-injector Inject 0.25 mg under the skin into the appropriate area as directed 1 (One) Time Per Week. 5/10/23  Yes Mei Gifford APRN   medroxyPROGESTERone (Provera) 10 MG tablet Take 1 tablet by mouth Daily. 5/11/23   Macy Lovell MD     PE  /82 (BP Location: Left arm, Patient Position: Sitting, Cuff Size: Adult)   Ht 160 cm (63\")   Wt (!) 138 kg (303 lb 3.2 oz)   BMI 53.71 kg/m²        General: Alert, healthy, no distress, well nourished and well developed.  Neurologic: Alert, oriented to person, place, and time.  Gait normal.  Cranial nerves II-XII grossly intact.  HEENT: Normocephalic, atraumatic.  Extraocular muscles intact, pupils equal and reactive times two.    Neck: Supple, no adenopathy, thyroid normal size, " non-tender, without nodularity, trachea midline.Lungs: Normal respiratory effort.  Clear to auscultation bilaterally.  No wheezes, rhonci, or rales.  Heart: Regular rate and rhythm.  No murmer, rub or gallop.  Abdomen: Soft, non-tender, non-distended,no masses, no hepatosplenomegaly, no hernia.  Skin: No rash, no lesions.  Extremities: No cyanosis, clubbing or edema.    IMPRESSION  Do Miles is a 38 y.o.  presenting with AUB, I suspect AUB-A (adenomyosis), with iron deficiency, failed medical management.  Discussed that I do not recommend an ablation based on the size of her uterus which I feel will cause the ablation to fail due to suspected adenomyosis as well as her obesity which puts her at risk for hyperplasia.  Proceed with total laparoscopic hysterectomy, bilateral salpingectomy, possible oophorectomy, possible exploratory laparotomy, cystoscopy.  Discussed R/B/A.  Discussed risks including injury to surrounding organ (bowel, bladder, ureters, blood vessels, nerves), infection, bleeding (possibly requiring blood transfusion), heart attack, stroke, and death.  Discussed possible need for conversion to open procedure.  Will give pre-op ABX (Ancef 3 g).  Discussed pro/cons of ovarian preservation.  Discussed that surgical menopause increases the risk of osteoporosis and early heart disease if untreated.  Discussed that if she chooses HRT, there is a decreased risk of colon cancer, decrease in osteoporosis, slight increase in breast cancer risk, and slight increase risk of DVT/clot per Women's Health Initiative data.  Patient desires ovarian preservation.  Patient voices understanding and is agreeable.    PLAN    1. Pre-op evaluation    2. Abnormal uterine bleeding (AUB)    3. Iron deficiency anemia due to chronic blood loss    This document has been electronically signed by Macy Lovell MD on 2023 13:33 CDT.

## 2023-09-06 ENCOUNTER — HOSPITAL ENCOUNTER (OUTPATIENT)
Facility: HOSPITAL | Age: 38
Setting detail: HOSPITAL OUTPATIENT SURGERY
Discharge: HOME | End: 2023-09-06
Attending: OBSTETRICS & GYNECOLOGY | Admitting: OBSTETRICS & GYNECOLOGY
Payer: COMMERCIAL

## 2023-09-06 ENCOUNTER — HOSPITAL ENCOUNTER (EMERGENCY)
Facility: HOSPITAL | Age: 38
Discharge: HOME | End: 2023-09-06
Attending: EMERGENCY MEDICINE | Admitting: EMERGENCY MEDICINE
Payer: COMMERCIAL

## 2023-09-06 ENCOUNTER — ANESTHESIA (OUTPATIENT)
Dept: EMERGENCY DEPT | Facility: HOSPITAL | Age: 38
End: 2023-09-06

## 2023-09-06 VITALS
WEIGHT: 293 LBS | RESPIRATION RATE: 18 BRPM | HEART RATE: 72 BPM | BODY MASS INDEX: 51.91 KG/M2 | TEMPERATURE: 96.9 F | OXYGEN SATURATION: 97 % | SYSTOLIC BLOOD PRESSURE: 210 MMHG | HEIGHT: 63 IN | DIASTOLIC BLOOD PRESSURE: 105 MMHG

## 2023-09-06 VITALS
RESPIRATION RATE: 18 BRPM | HEART RATE: 78 BPM | WEIGHT: 293 LBS | DIASTOLIC BLOOD PRESSURE: 93 MMHG | TEMPERATURE: 98.3 F | BODY MASS INDEX: 51.91 KG/M2 | HEIGHT: 63 IN | OXYGEN SATURATION: 95 % | SYSTOLIC BLOOD PRESSURE: 196 MMHG

## 2023-09-06 DIAGNOSIS — I10 PRIMARY HYPERTENSION: Primary | ICD-10-CM

## 2023-09-06 DIAGNOSIS — N93.9 ABNORMAL UTERINE BLEEDING (AUB): ICD-10-CM

## 2023-09-06 LAB
ABO GROUP BLD: NORMAL
B-HCG UR QL: NEGATIVE
BLD GP AB SCN SERPL QL: NEGATIVE
Lab: NORMAL
RH BLD: POSITIVE
T&S EXPIRATION DATE: NORMAL

## 2023-09-06 PROCEDURE — 86900 BLOOD TYPING SEROLOGIC ABO: CPT | Performed by: OBSTETRICS & GYNECOLOGY

## 2023-09-06 PROCEDURE — 96374 THER/PROPH/DIAG INJ IV PUSH: CPT

## 2023-09-06 PROCEDURE — 25010000002 HYDRALAZINE PER 20 MG: Performed by: EMERGENCY MEDICINE

## 2023-09-06 PROCEDURE — 96375 TX/PRO/DX INJ NEW DRUG ADDON: CPT

## 2023-09-06 PROCEDURE — 25010000002 LABETALOL 5 MG/ML SOLUTION: Performed by: EMERGENCY MEDICINE

## 2023-09-06 PROCEDURE — 96376 TX/PRO/DX INJ SAME DRUG ADON: CPT

## 2023-09-06 PROCEDURE — 81025 URINE PREGNANCY TEST: CPT | Performed by: OBSTETRICS & GYNECOLOGY

## 2023-09-06 PROCEDURE — 99283 EMERGENCY DEPT VISIT LOW MDM: CPT

## 2023-09-06 PROCEDURE — 86850 RBC ANTIBODY SCREEN: CPT | Performed by: OBSTETRICS & GYNECOLOGY

## 2023-09-06 PROCEDURE — 25010000002 ONDANSETRON PER 1 MG: Performed by: EMERGENCY MEDICINE

## 2023-09-06 PROCEDURE — 86901 BLOOD TYPING SEROLOGIC RH(D): CPT | Performed by: OBSTETRICS & GYNECOLOGY

## 2023-09-06 PROCEDURE — 93010 ELECTROCARDIOGRAM REPORT: CPT | Performed by: INTERNAL MEDICINE

## 2023-09-06 PROCEDURE — 93005 ELECTROCARDIOGRAM TRACING: CPT | Performed by: EMERGENCY MEDICINE

## 2023-09-06 RX ORDER — LABETALOL HYDROCHLORIDE 5 MG/ML
40 INJECTION, SOLUTION INTRAVENOUS ONCE
Status: COMPLETED | OUTPATIENT
Start: 2023-09-06 | End: 2023-09-06

## 2023-09-06 RX ORDER — ACETAMINOPHEN 500 MG
1000 TABLET ORAL ONCE
Status: DISCONTINUED | OUTPATIENT
Start: 2023-09-06 | End: 2023-09-06 | Stop reason: HOSPADM

## 2023-09-06 RX ORDER — HYDRALAZINE HYDROCHLORIDE 20 MG/ML
20 INJECTION INTRAMUSCULAR; INTRAVENOUS ONCE
Status: COMPLETED | OUTPATIENT
Start: 2023-09-06 | End: 2023-09-06

## 2023-09-06 RX ORDER — SODIUM CHLORIDE 0.9 % (FLUSH) 0.9 %
3 SYRINGE (ML) INJECTION EVERY 12 HOURS SCHEDULED
Status: DISCONTINUED | OUTPATIENT
Start: 2023-09-06 | End: 2023-09-06 | Stop reason: HOSPADM

## 2023-09-06 RX ORDER — ONDANSETRON 2 MG/ML
4 INJECTION INTRAMUSCULAR; INTRAVENOUS ONCE
Status: COMPLETED | OUTPATIENT
Start: 2023-09-06 | End: 2023-09-06

## 2023-09-06 RX ORDER — SODIUM CHLORIDE, SODIUM LACTATE, POTASSIUM CHLORIDE, CALCIUM CHLORIDE 600; 310; 30; 20 MG/100ML; MG/100ML; MG/100ML; MG/100ML
125 INJECTION, SOLUTION INTRAVENOUS CONTINUOUS
Status: DISCONTINUED | OUTPATIENT
Start: 2023-09-06 | End: 2023-09-06 | Stop reason: HOSPADM

## 2023-09-06 RX ORDER — SODIUM CHLORIDE 9 MG/ML
40 INJECTION, SOLUTION INTRAVENOUS AS NEEDED
Status: DISCONTINUED | OUTPATIENT
Start: 2023-09-06 | End: 2023-09-06 | Stop reason: HOSPADM

## 2023-09-06 RX ORDER — AMLODIPINE BESYLATE 10 MG/1
10 TABLET ORAL
Status: DISCONTINUED | OUTPATIENT
Start: 2023-09-06 | End: 2023-09-06 | Stop reason: HOSPADM

## 2023-09-06 RX ORDER — CEFAZOLIN SODIUM IN 0.9 % NACL 3 G/100 ML
3 INTRAVENOUS SOLUTION, PIGGYBACK (ML) INTRAVENOUS ONCE
Status: DISCONTINUED | OUTPATIENT
Start: 2023-09-06 | End: 2023-09-06 | Stop reason: HOSPADM

## 2023-09-06 RX ORDER — SODIUM CHLORIDE 0.9 % (FLUSH) 0.9 %
10 SYRINGE (ML) INJECTION AS NEEDED
Status: DISCONTINUED | OUTPATIENT
Start: 2023-09-06 | End: 2023-09-06 | Stop reason: HOSPADM

## 2023-09-06 RX ORDER — ACETAMINOPHEN 500 MG
1000 TABLET ORAL ONCE
Status: COMPLETED | OUTPATIENT
Start: 2023-09-06 | End: 2023-09-06

## 2023-09-06 RX ORDER — GABAPENTIN 300 MG/1
600 CAPSULE ORAL ONCE
Status: DISCONTINUED | OUTPATIENT
Start: 2023-09-06 | End: 2023-09-06 | Stop reason: HOSPADM

## 2023-09-06 RX ORDER — SCOLOPAMINE TRANSDERMAL SYSTEM 1 MG/1
1 PATCH, EXTENDED RELEASE TRANSDERMAL CONTINUOUS
Status: DISCONTINUED | OUTPATIENT
Start: 2023-09-06 | End: 2023-09-06 | Stop reason: HOSPADM

## 2023-09-06 RX ADMIN — SODIUM CHLORIDE, POTASSIUM CHLORIDE, SODIUM LACTATE AND CALCIUM CHLORIDE 125 ML/HR: 600; 310; 30; 20 INJECTION, SOLUTION INTRAVENOUS at 06:21

## 2023-09-06 RX ADMIN — LABETALOL HYDROCHLORIDE 40 MG: 5 INJECTION, SOLUTION INTRAVENOUS at 11:22

## 2023-09-06 RX ADMIN — ONDANSETRON 4 MG: 2 INJECTION INTRAMUSCULAR; INTRAVENOUS at 11:24

## 2023-09-06 RX ADMIN — AMLODIPINE BESYLATE 10 MG: 10 TABLET ORAL at 09:32

## 2023-09-06 RX ADMIN — ACETAMINOPHEN 1000 MG: 500 TABLET, FILM COATED ORAL at 11:21

## 2023-09-06 RX ADMIN — LABETALOL HYDROCHLORIDE 40 MG: 5 INJECTION, SOLUTION INTRAVENOUS at 08:36

## 2023-09-06 RX ADMIN — HYDRALAZINE HYDROCHLORIDE 20 MG: 20 INJECTION INTRAMUSCULAR; INTRAVENOUS at 07:28

## 2023-09-06 NOTE — ED PROVIDER NOTES
Subjective   History of Present Illness  Patient presents to the emergency department with elevated blood pressures.  Patient has not taken her medications this morning secondary to an impending surgery.  Patient is normally on 10 mg amlodipine as well as clonidine, and hydralazine, and labetalol.  Patient is asymptomatic.  Patient went in for surgery this morning n.p.o.  Was noted to have blood pressures in the 200s over 100s.  Patient has an IV from the preop IV.  Patient sent to the emergency department and surgery was evidently canceled for the asymptomatic hypertension.  Does not appear that any attempt was made to lower the patient's blood pressure in the preop area.  Patient continues asymptomatic.    History provided by:  Patient    Review of Systems   Constitutional: Negative.  Negative for appetite change, chills and fever.   HENT: Negative.  Negative for congestion.    Eyes: Negative.  Negative for photophobia and visual disturbance.   Respiratory: Negative.  Negative for cough, chest tightness and shortness of breath.    Cardiovascular: Negative.  Negative for chest pain and palpitations.   Gastrointestinal: Negative.  Negative for abdominal pain, constipation, diarrhea, nausea and vomiting.   Endocrine: Negative.    Genitourinary: Negative.  Negative for decreased urine volume, dysuria, flank pain and hematuria.   Musculoskeletal: Negative.  Negative for arthralgias, back pain, myalgias, neck pain and neck stiffness.   Skin: Negative.  Negative for pallor.   Neurological: Negative.  Negative for dizziness, syncope, weakness, light-headedness, numbness and headaches.   Psychiatric/Behavioral: Negative.  Negative for confusion and suicidal ideas. The patient is not nervous/anxious.      Past Medical History:   Diagnosis Date    Allergic     Anemia     Anxiety     Depression     Heart murmur     History of gestational diabetes     History of transfusion 10/2015    Hyperlipidemia     Hypertension      Migraine     Obesity     PMS (premenstrual syndrome)     Varicella 1985       Allergies   Allergen Reactions    Latex     Adhesive Tape Hives       Past Surgical History:   Procedure Laterality Date    ANKLE SURGERY Left      SECTION  2009     SECTION WITH TUBAL  2014    D & C HYSTEROSCOPY  2016       Family History   Problem Relation Age of Onset    No Known Problems Father     Hypertension Mother     Uterine cancer Mother     No Known Problems Brother     No Known Problems Sister     No Known Problems Sister     Eczema Daughter     Eczema Daughter     No Known Problems Paternal Grandfather     No Known Problems Paternal Grandmother     Hypertension Maternal Grandmother     Hypertension Maternal Grandfather     Diabetes Maternal Grandfather     Breast cancer Neg Hx     Ovarian cancer Neg Hx     Colon cancer Neg Hx        Social History     Socioeconomic History    Marital status:    Tobacco Use    Smoking status: Never    Smokeless tobacco: Never   Vaping Use    Vaping Use: Never used   Substance and Sexual Activity    Alcohol use: Not Currently     Comment: 2-3 drinks every few months    Drug use: No    Sexual activity: Yes     Partners: Male     Birth control/protection: Surgical, Tubal ligation           Objective   Physical Exam  Vitals and nursing note reviewed.   Constitutional:       General: She is not in acute distress.     Appearance: She is well-developed. She is not diaphoretic.   HENT:      Head: Normocephalic and atraumatic.      Nose: Nose normal.   Eyes:      General: No scleral icterus.     Conjunctiva/sclera: Conjunctivae normal.   Neck:      Vascular: No JVD.   Cardiovascular:      Rate and Rhythm: Normal rate and regular rhythm.      Heart sounds: Normal heart sounds.   Pulmonary:      Effort: Pulmonary effort is normal. No respiratory distress.   Abdominal:      Palpations: Abdomen is soft.      Tenderness: There is no abdominal tenderness.    Musculoskeletal:         General: No tenderness or deformity. Normal range of motion.      Cervical back: Normal range of motion and neck supple.   Lymphadenopathy:      Cervical: No cervical adenopathy.   Skin:     General: Skin is warm and dry.      Coloration: Skin is not pale.      Findings: No erythema or rash.   Neurological:      General: No focal deficit present.      Mental Status: She is alert and oriented to person, place, and time.      Cranial Nerves: No cranial nerve deficit.      Motor: No abnormal muscle tone.   Psychiatric:         Behavior: Behavior normal.         Thought Content: Thought content normal.         Judgment: Judgment normal.       Procedures           ED Course                                         Labs Reviewed - No data to display    No orders to display         Medical Decision Making  Patient on long-term antihypertensive, with difficult to control patient's hypertension.  Patient is nauseous from not eating and somewhat improved with some bites of fluid.  Patient's blood pressure 190/90 at this juncture.  Will discharge to home for rest and relaxation as the stress of the ER seems to be worsening the patient's symptoms.  Patient and  are in agreement with this as well as checking blood pressures at home should things worsen.  Patient will be returned to her home medications.  Patient continues asymptomatic in terms of the blood pressure elevation.    Problems Addressed:  Primary hypertension: complicated acute illness or injury    Amount and/or Complexity of Data Reviewed  ECG/medicine tests: ordered.    Risk  OTC drugs.  Prescription drug management.        Final diagnoses:   Primary hypertension       ED Disposition  ED Disposition       ED Disposition   Discharge    Condition   Stable    Comment   --               Mei Gifford, APRN  [address]  [address] [facility] [address]  [facility] [address]  [phone]               Medication List        Changed      buPROPion  100 MG tablet  Commonly known as: WELLBUTRIN  Take 1 tablet by mouth Daily.  What changed: when to take this                 Ted Curiel MD  09/06/23 1328

## 2023-09-06 NOTE — ANESTHESIA PREPROCEDURE EVALUATION
Anesthesia Evaluation     Patient summary reviewed and Nursing notes reviewed   no history of anesthetic complications:   NPO Solid Status: > 8 hours  NPO Liquid Status: < 2 hours           Airway   Mallampati: II  TM distance: >3 FB  Neck ROM: full  Large neck circumference and Possible difficult intubation  Dental - normal exam     Pulmonary     breath sounds clear to auscultation  (-) asthma, shortness of breath, sleep apnea, rhonchi, decreased breath sounds, wheezes, rales, not a smoker  Cardiovascular   Exercise tolerance: good (4-7 METS)    ECG reviewed  Patient on routine beta blocker and Beta blocker given within 24 hours of surgery  Rhythm: regular  Rate: normal    (+) hypertension 2 medications or greater, valvular problems/murmurs murmur, murmur, hyperlipidemia  (-) past MI, dysrhythmias, angina, cardiac stents, CABG, DVT    ROS comment: [id number]  Normal sinus rhythm  Normal ECG  When compared with ECG of 09-OCT-2015 17:58,  No significant change was found      Neuro/Psych  (+) headaches (Migraines), psychiatric history Anxiety and Depression  (-) seizures, TIA, CVA  GI/Hepatic/Renal/Endo    (+) morbid obesity  (-) GERD, liver disease, no renal disease, diabetes, no thyroid disorder    Musculoskeletal     Abdominal   (+) obese   Substance History   (-) alcohol use, drug use     OB/GYN          Other        ROS/Med Hx Other: BMI: 53.07                  Anesthesia Plan    ASA 3     general     intravenous induction     Anesthetic plan, risks, benefits, and alternatives have been provided, discussed and informed consent has been obtained with: patient.      CODE STATUS:

## 2023-09-06 NOTE — DISCHARGE INSTRUCTIONS
Followup with Dr. Lovell for further surgical management.  Continue to check your blood pressures at home.  Return if blood pressure >220/100, rest, relax and followup with Mei Gifford for further long term blood pressure management.  Return with any new or worsening symptoms, or any concerns.

## 2023-09-06 NOTE — INTERVAL H&P NOTE
H&P reviewed. The patient was examined and there are no changes to the H&P. Surgery cancelled per Anesthesia this morning due to severely elevated BP (SBP 200s). This was despite various cuff sizes as well as manual BP. Discussed w/ patient and  regarding importance of controlled BP for anesthesia to prevent complications; voices understanding. Will send to ER for evaluation and treatment of BP. Will call patient later to reschedule surgery. Will need to be seen by PCP and Cardiology for clearance.    This document has been electronically signed by Macy Lovell MD on September 6, 2023 07:03 CDT.

## 2023-09-07 ENCOUNTER — TRANSCRIBE ORDERS (OUTPATIENT)
Dept: CARDIOLOGY | Facility: CLINIC | Age: 38
End: 2023-09-07
Payer: COMMERCIAL

## 2023-09-07 ENCOUNTER — TELEPHONE (OUTPATIENT)
Dept: FAMILY MEDICINE CLINIC | Facility: CLINIC | Age: 38
End: 2023-09-07
Payer: COMMERCIAL

## 2023-09-07 DIAGNOSIS — I10 ESSENTIAL HYPERTENSION: Primary | ICD-10-CM

## 2023-09-07 DIAGNOSIS — I10 PRIMARY HYPERTENSION: Primary | ICD-10-CM

## 2023-09-07 NOTE — TELEPHONE ENCOUNTER
Her Insurance, [employer] requires the referral from her PCP for Cardiology and for the ECHO, even though the appointments have already been made.     Referral needs to go in today for them to cover her upcoming appointments  tomorrow and next week.     AUNG Marinelli

## 2023-09-07 NOTE — TELEPHONE ENCOUNTER
Patient is requesting that you call her back regarding a referral for an Echo.  This needs to be put in today so she can have surgical clearance from [facility].   Her number is [phone]

## 2023-09-08 ENCOUNTER — OFFICE VISIT (OUTPATIENT)
Dept: CARDIOLOGY | Facility: CLINIC | Age: 38
End: 2023-09-08
Payer: COMMERCIAL

## 2023-09-08 VITALS
WEIGHT: 293 LBS | BODY MASS INDEX: 51.91 KG/M2 | SYSTOLIC BLOOD PRESSURE: 190 MMHG | HEART RATE: 80 BPM | OXYGEN SATURATION: 98 % | HEIGHT: 63 IN | DIASTOLIC BLOOD PRESSURE: 80 MMHG

## 2023-09-08 DIAGNOSIS — I10 HYPERTENSION, UNSPECIFIED TYPE: Primary | ICD-10-CM

## 2023-09-08 DIAGNOSIS — E66.01 MORBID OBESITY WITH BMI OF 50.0-59.9, ADULT: ICD-10-CM

## 2023-09-08 DIAGNOSIS — Z01.810 PREOP CARDIOVASCULAR EXAM: ICD-10-CM

## 2023-09-08 DIAGNOSIS — R01.1 HEART MURMUR: ICD-10-CM

## 2023-09-08 PROCEDURE — 99204 OFFICE O/P NEW MOD 45 MIN: CPT | Performed by: INTERNAL MEDICINE

## 2023-09-08 RX ORDER — CARVEDILOL 25 MG/1
25 TABLET ORAL 2 TIMES DAILY
Qty: 180 TABLET | Refills: 0 | Status: SHIPPED | OUTPATIENT
Start: 2023-09-08

## 2023-09-08 NOTE — PROGRESS NOTES
[facility] Cardiology  OFFICE NOTE    Cardiovascular Medicine  Ron Locke M.D., Located within [facility]         Balta Mei M, APRN  [address]  [address]  [place] [id number]    Thank you for asking me to see Do Miles for hypertension.    History of Present Illness    Do Miles is a 38 y.o. female who presents for consultation today.   She was scheduled for laparoscopic hysterectomy/salpingectomy/possible oophorectomy/possible exploratory laparotomy/cystoscopy earlier this week.  This surgery was postponed because of severe hypertension.  She was referred to our clinic for further evaluation.  She reports being diagnosed with hypertension approximately 14 years ago.  This was in the setting of pregnancy.  She is currently on a regimen of labetalol 200 mg orally twice daily, hydralazine 10 mg orally twice daily, amlodipine 10 mg oral daily and clonidine 0.2 mg orally every night.  She reports taking these medications on a regular basis.  Home SBP readings have been in the range of 130s-170s mmHg.  During her routine day-to-day activities, she denies having any chest discomfort or dyspnea.  She has not had any PND, orthopnea or leg swelling.  She has not had any palpitations, dizziness, presyncope or syncope.  Her functional capacity based on [facility] activity status index appears to be 8.9 METS.    Review of Systems - ROS  Constitution: Negative for weakness, weight gain.   HENT: Negative for congestion.    Eyes: Negative for blurred vision.   Cardiovascular: As mentioned above  Respiratory: Negative for cough and hemoptysis.    Endocrine: Negative for polydipsia and polyuria.   Hematologic/Lymphatic: Negative for active bleeding problem.   Skin: Negative for flushing.   Musculoskeletal: Negative for neck pain and stiffness.   Gastrointestinal: Negative for abdominal pain, nausea and vomiting.   Genitourinary: Negative for dysuria and hematuria.   Neurological: Negative  for dizziness, focal weakness and numbness.   Psychiatric/Behavioral: Negative for altered mental status and depression.      All other systems were reviewed and were negative.    Past Medical History:   Diagnosis Date    Allergic     Anemia     Anxiety     Depression     Heart murmur     History of gestational diabetes     History of transfusion 10/2015    Hyperlipidemia     Hypertension     Migraine     Obesity     PMS (premenstrual syndrome)     Varicella 1985       Family History: Her mother and one of her grandparents had hypertension.  She denies any family history of coronary artery disease, congestive heart failure or sudden cardiac death.  family history includes Diabetes in her maternal grandfather; Eczema in her daughter and daughter; Hypertension in her maternal grandfather, maternal grandmother, and mother; No Known Problems in her brother, father, paternal grandfather, paternal grandmother, sister, and sister; Uterine cancer in her mother.    Social History: Denies current tobacco, alcohol or illicit drug use.   reports that she has never smoked. She has never used smokeless tobacco. She reports that she does not currently use alcohol. She reports that she does not use drugs.    Allergies:  Allergies   Allergen Reactions    Latex     Adhesive Tape Hives         Current Outpatient Medications:     amLODIPine (Norvasc) 10 MG tablet, Take 1 tablet by mouth Daily., Disp: 90 tablet, Rfl: 3    buPROPion (WELLBUTRIN) 100 MG tablet, Take 1 tablet by mouth Daily. (Patient taking differently: Take 1 tablet by mouth Every Night.), Disp: 90 tablet, Rfl: 3    busPIRone (BUSPAR) 15 MG tablet, Take 1 tablet by mouth 3 (Three) Times a Day., Disp: 270 tablet, Rfl: 3    cloNIDine (CATAPRES) 0.2 MG tablet, Take 1 tablet by mouth every night at bedtime., Disp: 30 tablet, Rfl: 1    ferrous sulfate 325 (65 FE) MG EC tablet, Take 1 tablet by mouth 3 (Three) Times a Day With Meals., Disp: 90 tablet, Rfl: 3    hydrALAZINE  "(APRESOLINE) 10 MG tablet, Take 1 tablet by mouth 2 (Two) Times a Day., Disp: 60 tablet, Rfl: 1    medroxyPROGESTERone (Provera) 10 MG tablet, Take 1 tablet by mouth 3 (Three) Times a Day., Disp: 90 tablet, Rfl: 6    Melatonin 10 MG capsule, Take 1 capsule by mouth At Night As Needed., Disp: , Rfl:     carvedilol (COREG) 25 MG tablet, Take 1 tablet by mouth 2 (Two) Times a Day., Disp: 180 tablet, Rfl: 0    Physical Exam:  Vitals:    09/08/23 1110   BP: (!) 190/80   BP Location: Left arm   Patient Position: Sitting   Cuff Size: Adult   Pulse: 80   SpO2: 98%   Weight: 136 kg (299 lb 14.4 oz)   Height: 160 cm (63\")   PainSc: 0-No pain     Current Pain Level: none  Pulse Ox: Normal  on room air  General: alert, appears stated age and cooperative     Body Habitus: Morbidly obese  HEENT: Head: Normocephalic, no lesions, without obvious abnormality.   Neuro: alert, oriented x3  JVP: Volume/Pulsation: Normal.  Normal waveforms.   Carotid Exam: no bruit normal pulsation bilaterally     Respirations: no increased work of breathing   Chest:  Normal    Pulmonary:Normal   Heart rate: normal    Heart Rhythm: regular     Heart Sounds: S1: normal  S2: normal  S3: absent     Murmur auscultated at bilateral upper sternal borders   Abdomen:   Appearance:   Palpation: Soft, non-tender to palpation, bowel sounds positive in all four quadrants  Extremity: no significant pitting lower extremity edema.       DATA REVIEWED:     EKG. I personally reviewed and interpreted the EKG.  normal sinus rhythm, minimal voltage criteria for LVH on 9/5/2023    ECG/EMG Results (all)       None          ---------------------------------------------------  TTE/MOHINDER:      -----------------------------------------------------  CXR/Imaging:   Imaging Results (Most Recent)       None            -----------------------------------------------------  CT:   No radiology results for the last 30 " days.    ----------------------------------------------------      --------------------------------------------------------------------------------------------------  LABS:     The 10-year CVD risk score (Sofy et al., 2008) is: 8.5%    Values used to calculate the score:      Age: 38 years      Sex: Female      Diabetic: No      Tobacco smoker: No      Systolic Blood Pressure: 190 mmHg      Is BP treated: Yes      HDL Cholesterol: 55 mg/dL      Total Cholesterol: 183 mg/dL         Lab Results   Component Value Date    GLUCOSE 89 09/05/2023    BUN 15 09/05/2023    CREATININE 0.79 09/05/2023    EGFRIFNONA 72 03/04/2021    BCR 19.0 09/05/2023    K 4.2 09/05/2023    CO2 24.0 09/05/2023    CALCIUM 10.0 09/05/2023    ALBUMIN 3.8 03/17/2023    AST 17 03/17/2023    ALT 14 03/17/2023     Lab Results   Component Value Date    WBC 10.65 09/05/2023    HGB 9.5 (L) 09/05/2023    HCT 32.4 (L) 09/05/2023    MCV 69.1 (L) 09/05/2023     (H) 09/05/2023     Lab Results   Component Value Date    CHOL 183 03/17/2023    TRIG 45 03/17/2023    HDL 55 03/17/2023     (H) 03/17/2023     Lab Results   Component Value Date    TSH 2.330 03/17/2023     Lab Results   Component Value Date    CKTOTAL 126 10/10/2015    CKMB 0.9 10/10/2015    TROPONINI 0.048 (H) 10/10/2015     Lab Results   Component Value Date    HGBA1C 5.50 03/17/2023     No results found for: DDIMER  Lab Results   Component Value Date    ALT 14 03/17/2023     Lab Results   Component Value Date    HGBA1C 5.50 03/17/2023    HGBA1C 5.0 10/13/2015     Lab Results   Component Value Date    CREATININE 0.79 09/05/2023     Lab Results   Component Value Date    IRON 86 05/08/2023    TIBC 454 05/08/2023    FERRITIN 9.89 (L) 05/08/2023     No results found for: INR, PROTIME    [unfilled]    1. Hypertension, unspecified type  - carvedilol (COREG) 25 MG tablet; Take 1 tablet by mouth 2 (Two) Times a Day.  Dispense: 180 tablet; Refill: 0  - Metanephrines, Frac. Free,  Plasma; Future  - Catecholamines, Fractionated, Plasma; Future  - Aldosterone / Renin Ratio; Future  - Duplex Renal Artery - Bilateral Complete CAR; Future    2. Preop cardiovascular exam    3. Heart murmur    The patient is a 38-year-old female with a known history of hypertension who was referred to our office for preoperative cardiovascular risk stratification.  She was scheduled for laparoscopic hysterectomy/salpingectomy/possible oophorectomy/possible exploratory laparotomy/cystoscopy earlier this week.  This surgery was postponed because of severe hypertension.  She denies any concerning cardiovascular symptoms at this point.  She does not have any RCRI risk factors other than potential elevated risk surgery.  Her functional capacity based on [facility] activity status index appears to be 8.9 METS.  She does appear to have a murmur on exam.  She is scheduled for a transthoracic echocardiogram next week to look for underlying structural heart disease.  Continue amlodipine 10 mg oral daily, hydralazine 10 mg oral twice daily and clonidine 0.2 mg orally every night for hypertension.  Discontinue labetalol (200 mg oral twice daily) and initiate carvedilol 25 mg orally twice daily.  Dietary sodium restriction was discussed.  Obtain screening work-up for secondary causes of hypertension considering early age of onset of hypertension.  Serum TSH level was normal in March 2023.  She will continue to check her blood pressure at home on a regular basis and bring a log at next visit for our review.  We will reevaluate her in clinic in a couple of weeks.    4. Morbid obesity with BMI of 50.0-59.9, adult  Dietary modification was discussed.      Prevention:  Body mass index is 53.12 kg/m².       Do Miles  reports that she has never smoked. She has never used smokeless tobacco..       Return in about 2 weeks (around 9/22/2023).            Electronically signed by Ron Locke MD on 09/08/23 at 11:39  CDT

## 2023-09-09 LAB
QT INTERVAL: 398 MS
QT INTERVAL: 414 MS
QTC INTERVAL: 443 MS
QTC INTERVAL: 444 MS

## 2023-09-12 ENCOUNTER — LAB (OUTPATIENT)
Dept: LAB | Facility: HOSPITAL | Age: 38
End: 2023-09-12
Payer: COMMERCIAL

## 2023-09-12 DIAGNOSIS — I10 HYPERTENSION, UNSPECIFIED TYPE: ICD-10-CM

## 2023-09-12 PROCEDURE — 36415 COLL VENOUS BLD VENIPUNCTURE: CPT

## 2023-09-12 PROCEDURE — 82384 ASSAY THREE CATECHOLAMINES: CPT

## 2023-09-12 PROCEDURE — 84244 ASSAY OF RENIN: CPT

## 2023-09-12 PROCEDURE — 83835 ASSAY OF METANEPHRINES: CPT

## 2023-09-12 PROCEDURE — 82088 ASSAY OF ALDOSTERONE: CPT

## 2023-09-16 LAB
ALDOST SERPL-MCNC: 14 NG/DL (ref 0–30)
ALDOST/RENIN PLAS-RTO: 20.4 {RATIO} (ref 0–30)
RENIN PLAS-CCNC: 0.69 NG/ML/HR (ref 0.17–5.38)

## 2023-09-18 LAB
METANEPH FREE SERPL-MCNC: 12.3 PG/ML (ref 0–88)
NORMETANEPHRINE SERPL-MCNC: 110.3 PG/ML (ref 0–210.1)

## 2023-09-18 NOTE — PROGRESS NOTES
Serum aldosterone-renin ratio was within normal limits.  Serum normetanephrine and metanephrine levels were within normal limits as well.

## 2023-09-20 ENCOUNTER — OFFICE VISIT (OUTPATIENT)
Dept: CARDIOLOGY | Facility: CLINIC | Age: 38
End: 2023-09-20
Payer: COMMERCIAL

## 2023-09-20 VITALS
OXYGEN SATURATION: 97 % | SYSTOLIC BLOOD PRESSURE: 140 MMHG | HEIGHT: 63 IN | DIASTOLIC BLOOD PRESSURE: 88 MMHG | BODY MASS INDEX: 51.91 KG/M2 | HEART RATE: 83 BPM | WEIGHT: 293 LBS

## 2023-09-20 DIAGNOSIS — R01.1 HEART MURMUR: ICD-10-CM

## 2023-09-20 DIAGNOSIS — Z01.810 PREOP CARDIOVASCULAR EXAM: ICD-10-CM

## 2023-09-20 DIAGNOSIS — I10 HYPERTENSION, UNSPECIFIED TYPE: Primary | ICD-10-CM

## 2023-09-20 DIAGNOSIS — I77.819 AORTIC DILATATION: ICD-10-CM

## 2023-09-20 DIAGNOSIS — E66.01 MORBID OBESITY WITH BMI OF 50.0-59.9, ADULT: ICD-10-CM

## 2023-09-20 PROCEDURE — 99214 OFFICE O/P EST MOD 30 MIN: CPT | Performed by: INTERNAL MEDICINE

## 2023-09-20 RX ORDER — LOSARTAN POTASSIUM 50 MG/1
50 TABLET ORAL DAILY
Qty: 90 TABLET | Refills: 1 | Status: SHIPPED | OUTPATIENT
Start: 2023-09-20

## 2023-09-20 NOTE — PROGRESS NOTES
[facility] Cardiology  OFFICE NOTE    Cardiovascular Medicine  Ron Locke M.D., [facility]         No referring provider defined for this encounter.    History of Present Illness    Do Miles is a 38 y.o. female who presents for consultation today.   She was scheduled for laparoscopic hysterectomy/salpingectomy/possible oophorectomy/possible exploratory laparotomy/cystoscopy earlier this week.  This surgery was postponed because of severe hypertension.  She was referred to our clinic for further evaluation.  She reports being diagnosed with hypertension approximately 14 years ago.  This was in the setting of pregnancy.  She is currently on a regimen of labetalol 200 mg orally twice daily, hydralazine 10 mg orally twice daily, amlodipine 10 mg oral daily and clonidine 0.2 mg orally every night.  She reports taking these medications on a regular basis.  Home SBP readings have been in the range of 130s-170s mmHg.  During her routine day-to-day activities, she denies having any chest discomfort or dyspnea.  She has not had any PND, orthopnea or leg swelling.  She has not had any palpitations, dizziness, presyncope or syncope.  Her functional capacity based on [facility] activity status index appears to be 8.9 METS.    9/20/2023:  She presented today for a follow-up visit.  She reported having 1 episode of left-sided sharp chest discomfort earlier today; this lasted a few minutes and resolved spontaneously.  She has not had any exertional chest discomfort.  She denies having any other concerning cardiovascular symptoms.  Her home SBP readings are now in the range of 140s-160s mmHg.  Home DBP readings are in the range of 80s mmHg.  She has made the medication changes recommended last visit.    Review of Systems - ROS  Constitution: Negative for weakness, weight gain.   HENT: Negative for congestion.    Eyes: Negative for blurred vision.   Cardiovascular: As mentioned above  Respiratory:  Negative for cough and hemoptysis.    Endocrine: Negative for polydipsia and polyuria.   Hematologic/Lymphatic: Negative for active bleeding problem.   Skin: Negative for flushing.   Musculoskeletal: Negative for neck pain and stiffness.   Gastrointestinal: Negative for abdominal pain, nausea and vomiting.   Genitourinary: Negative for dysuria and hematuria.   Neurological: Negative for dizziness, focal weakness and numbness.   Psychiatric/Behavioral: Negative for altered mental status and depression.      All other systems were reviewed and were negative.    Past Medical History:   Diagnosis Date    Allergic     Anemia     Anxiety     Depression     Heart murmur     History of gestational diabetes     History of transfusion 10/2015    Hyperlipidemia     Hypertension     Migraine     Obesity     PMS (premenstrual syndrome)     Varicella 1985       Family History: Her mother and one of her grandparents had hypertension.  She denies any family history of coronary artery disease, congestive heart failure or sudden cardiac death.  family history includes Diabetes in her maternal grandfather; Eczema in her daughter and daughter; Hypertension in her maternal grandfather, maternal grandmother, and mother; No Known Problems in her brother, father, paternal grandfather, paternal grandmother, sister, and sister; Uterine cancer in her mother.    Social History: Denies current tobacco, alcohol or illicit drug use.   reports that she has never smoked. She has never used smokeless tobacco. She reports that she does not currently use alcohol. She reports that she does not use drugs.    Allergies:  Allergies   Allergen Reactions    Latex     Adhesive Tape Hives         Current Outpatient Medications:     amLODIPine (Norvasc) 10 MG tablet, Take 1 tablet by mouth Daily., Disp: 90 tablet, Rfl: 3    ASHWAGANDHA PO, Take 1 capsule by mouth As Needed., Disp: , Rfl:     buPROPion (WELLBUTRIN) 100 MG tablet, Take 1 tablet by mouth Daily.  "(Patient taking differently: Take 1 tablet by mouth Every Night.), Disp: 90 tablet, Rfl: 3    busPIRone (BUSPAR) 15 MG tablet, Take 1 tablet by mouth 3 (Three) Times a Day., Disp: 270 tablet, Rfl: 3    carvedilol (COREG) 25 MG tablet, Take 1 tablet by mouth 2 (Two) Times a Day., Disp: 180 tablet, Rfl: 0    cloNIDine (CATAPRES) 0.2 MG tablet, Take 1 tablet by mouth every night at bedtime., Disp: 30 tablet, Rfl: 1    ferrous sulfate 325 (65 FE) MG EC tablet, Take 1 tablet by mouth 3 (Three) Times a Day With Meals., Disp: 90 tablet, Rfl: 3    hydrALAZINE (APRESOLINE) 10 MG tablet, Take 1 tablet by mouth 2 (Two) Times a Day., Disp: 60 tablet, Rfl: 1    medroxyPROGESTERone (Provera) 10 MG tablet, Take 1 tablet by mouth 3 (Three) Times a Day., Disp: 90 tablet, Rfl: 6    Melatonin 10 MG capsule, Take 1 capsule by mouth At Night As Needed., Disp: , Rfl:     losartan (COZAAR) 50 MG tablet, Take 1 tablet by mouth Daily., Disp: 90 tablet, Rfl: 1    Physical Exam:  Vitals:    09/20/23 1349   BP: 140/88   BP Location: Left arm   Patient Position: Sitting   Cuff Size: Adult   Pulse: 83   SpO2: 97%   Weight: 134 kg (296 lb)   Height: 160 cm (63\")   PainSc: 0-No pain     Current Pain Level: none  Pulse Ox: Normal  on room air  General: alert, appears stated age and cooperative     Body Habitus: Morbidly obese  HEENT: Head: Normocephalic, no lesions, without obvious abnormality.   Neuro: alert, oriented x3  JVP: Volume/Pulsation: Normal.  Normal waveforms.   Carotid Exam: no bruit normal pulsation bilaterally     Respirations: no increased work of breathing   Chest:  Normal    Pulmonary:Normal   Heart rate: normal    Heart Rhythm: regular     Heart Sounds: S1: normal  S2: normal  S3: absent     Murmur auscultated at bilateral upper sternal borders   Abdomen:   Appearance:   Palpation: Soft, non-tender to palpation, bowel sounds positive in all four quadrants  Extremity: no significant pitting lower extremity edema.       DATA " REVIEWED:     EKG. I personally reviewed and interpreted the EKG.  normal sinus rhythm, minimal voltage criteria for LVH on 9/5/2023    ECG/EMG Results (all)       None          ---------------------------------------------------  TTE/MOHINDER:  Results for orders placed in visit on 09/12/23    Adult Transthoracic Echo Complete w/ Color, Spectral and Contrast if Necessary Per Protocol    Interpretation Summary    Left ventricular systolic function is normal. Estimated left ventricular EF = 58% Left ventricular ejection fraction appears to be 56 - 60%.    Borderline elevated transaortic gradients are noted. No hemodynamically significant aortic valve stenosis is present. Vmax 2.04 m/s, mean gradient 8 mm Hg.    Proximal ascending aorta measured at 3.9 cm.    There is no evidence of pericardial effusion.    -----------------------------------------------------  CXR/Imaging:   Imaging Results (Most Recent)       None            -----------------------------------------------------  CT:   No radiology results for the last 30 days.    ----------------------------------------------------      --------------------------------------------------------------------------------------------------  LABS:     The 10-year CVD risk score (Sofy et al., 2008) is: 8.5%    Values used to calculate the score:      Age: 38 years      Sex: Female      Diabetic: No      Tobacco smoker: No      Systolic Blood Pressure: 190 mmHg      Is BP treated: Yes      HDL Cholesterol: 55 mg/dL      Total Cholesterol: 183 mg/dL         Lab Results   Component Value Date    GLUCOSE 89 09/05/2023    BUN 15 09/05/2023    CREATININE 0.79 09/05/2023    EGFRIFNONA 72 03/04/2021    BCR 19.0 09/05/2023    K 4.2 09/05/2023    CO2 24.0 09/05/2023    CALCIUM 10.0 09/05/2023    ALBUMIN 3.8 03/17/2023    AST 17 03/17/2023    ALT 14 03/17/2023     Lab Results   Component Value Date    WBC 10.65 09/05/2023    HGB 9.5 (L) 09/05/2023    HCT 32.4 (L) 09/05/2023    MCV  69.1 (L) 09/05/2023     (H) 09/05/2023     Lab Results   Component Value Date    CHOL 183 03/17/2023    TRIG 45 03/17/2023    HDL 55 03/17/2023     (H) 03/17/2023     Lab Results   Component Value Date    TSH 2.330 03/17/2023     Lab Results   Component Value Date    CKTOTAL 126 10/10/2015    CKMB 0.9 10/10/2015    TROPONINI 0.048 (H) 10/10/2015     Lab Results   Component Value Date    HGBA1C 5.50 03/17/2023     No results found for: DDIMER  Lab Results   Component Value Date    ALT 14 03/17/2023     Lab Results   Component Value Date    HGBA1C 5.50 03/17/2023    HGBA1C 5.0 10/13/2015     Lab Results   Component Value Date    CREATININE 0.79 09/05/2023     Lab Results   Component Value Date    IRON 86 05/08/2023    TIBC 454 05/08/2023    FERRITIN 9.89 (L) 05/08/2023     No results found for: INR, PROTIME    [unfilled]    1. Hypertension  - Catecholamines, Fractionated, Plasma; Future    2. Preop cardiovascular exam    3. Heart murmur    4.  Aortic dilation    The patient is a 38-year-old female with a known history of hypertension who was referred to our office for preoperative cardiovascular risk stratification.  She was scheduled for laparoscopic hysterectomy/salpingectomy/possible oophorectomy/possible exploratory laparotomy/cystoscopy earlier this month.  This surgery was postponed because of severe hypertension.  She continues to deny any concerning cardiovascular symptoms at this point.  She does not have any RCRI risk factors other than potential elevated risk surgery.  As estimated at last visit, her functional capacity based on [facility] activity status index appears to be 8.9 METS.  She denies having any changes in her functional capacity since her last visit 2 weeks ago.  She does appear to have a murmur on exam.  Transthoracic echocardiogram in September 2023 showed LVEF of 56-60%, normal biatrial sizes, normal RV cavity size/systolic function, trace to mild AI, trace MR and no evidence  of pericardial effusion.  Proximal ascending aorta measured at 3.9 cm.  Borderline elevated transaortic gradients (V-max 2.04 m/s, mean gradient 8 mmHg) were noted with no hemodynamically significant aortic valve stenosis.   Renal artery duplex was negative for renal artery stenosis in September 2023.  Serum aldosterone/renin ratio was normal in September 2023.  Serum metanephrine and normetanephrine levels were normal in September 2023.  Serum TSH level was normal in March 2023.  Continue amlodipine 10 mg oral daily, carvedilol 25 mg oral twice daily, hydralazine 10 mg oral twice daily and clonidine 0.2 mg orally every night for hypertension. Dietary sodium restriction was discussed.  Her clinic and home BP readings have improved.  We will initiate losartan 50 mg orally daily and check BMP in a couple of weeks to monitor kidney function and electrolytes.  She will continue to check her blood pressure at home on a regular basis and bring a log at next visit for our review.  We will reevaluate her in clinic in 1 month.    5. Morbid obesity with BMI of 50.0-59.9, adult  Dietary modification was discussed.      Prevention:  Body mass index is 52.43 kg/m².       Do SANCHO Miles  reports that she has never smoked. She has never used smokeless tobacco..       Return in about 1 month (around 10/20/2023).            Electronically signed by Ron Locke MD on 09/20/23 at 11:39 CDT

## 2023-09-25 LAB
DOPAMINE SERPL-MCNC: <30 PG/ML (ref 0–48)
EPINEPH PLAS-MCNC: 36 PG/ML (ref 0–62)
NOREPINEPH PLAS-MCNC: 500 PG/ML (ref 0–874)

## 2024-01-17 NOTE — PROGRESS NOTES
"Subjective   Do Miles is a 32 y.o. female.     History of Present Illness  Patient is a 32-year-old  female who is presenting today with a constellation of symptoms which include cough, fever, and earache.  Patient states that she started feeling congested approximately 4 days ago and subsequently [place] developed an earache bilaterally.  Patient states is far sick contacts that her daughter was recently diagnosed with strep on January 26 and has completed her antibiotics.  Patient at this time states that she's been having some subjective fever, chills, and generalized weakness.  Patient states that her sinuses are very tender at this point and she overall feels congested.  Patient denies any productive cough.    The following portions of the patient's history were reviewed and updated as appropriate: allergies, current medications, past family history, past medical history, past social history, past surgical history and problem list.    Review of Systems    Constitutional: positive for generalized weakness, fevers, chills  HEENT: positive for nasal drainage  Skin: no rash, no discoloration   CVS: no chest pain, palpitations  Chest: positive for cough   GI: no abdominal pain, blood in stool, no nausea, vomiting, diarrhea  : no burning in urination, change in odor, no change in frequency  Neuro: positive for headache  Musculoskeletal: no muscle, back pain, joint pain, stiffness  Lymphatics: no enlarged nodes  Endo: no reports of sweating, cold or heat intolerance, no polyuria      Objective   Physical Exam  /80  Pulse 81  Temp 99.4 °F (37.4 °C)  Ht 157.5 cm (62\")  Wt 124 kg (273 lb)  LMP 01/18/2018  SpO2 98%  BMI 49.93 kg/m2      CONSTITUTIONAL: The vital signs showed that the patient was afebrile; blood pressure and heart rate were within normal limits. The patient appeared alert.  EYES: The conjunctiva was clear. The pupil was equal and reactive. There " · Incidental finding on CT imaging, loculated  · S/p IR thoracentesis 1/18 with drainage of 1.1L  · Breathing comfortably on room air    was no ptosis.  EARS, NOSE AND THROAT: The ears appeared normal, patient did have drainage present bilaterally in the nares. Hearing was grossly intact. The oropharynx showed that the mucosa was moist. There was no lesion that I could see in the palate, posterior pharynx, patient did have tenderness to palpation in the maxillary and frontal sinus   NECK: The neck was supple.   RESPIRATORY: The patient’s respiratory effort was normal. Auscultation of the lung showed it to be clear with good air movement.  CARDIOVASCULAR: Auscultation of the heart revealed S1 and S2 with regular rate with no murmur noted. The extremities showed no edema.  SKIN: Inspection of the skin and subcutaneous tissues appeared to be normal. The skin was pink, warm and dry to touch.  NEUROLOGIC: . Sensation was grossly intact by touch. CN2-12 grossly intact  PSYCHIATRIC: The patient was oriented to time, place and person. The patient’s judgment and insight appeared to be normal.      Assessment/Plan   Do was seen today for cough, fever and earache.    Diagnoses and all orders for this visit:    Acute URI     -patient has been having symptoms for five days, does not warrant the use of antibiotics at this time, flu screen was negative, symptomatic care at this time          This document has been electronically signed by Angy Barboza MD on February 5, 2018 9:30 AM